# Patient Record
Sex: FEMALE | Race: WHITE | NOT HISPANIC OR LATINO | Employment: PART TIME | URBAN - METROPOLITAN AREA
[De-identification: names, ages, dates, MRNs, and addresses within clinical notes are randomized per-mention and may not be internally consistent; named-entity substitution may affect disease eponyms.]

---

## 2017-04-25 ENCOUNTER — HOSPITAL ENCOUNTER (OUTPATIENT)
Dept: LAB | Facility: HOSPITAL | Age: 62
Discharge: HOME | End: 2017-04-25
Attending: NURSE PRACTITIONER | Admitting: NURSE PRACTITIONER

## 2017-04-25 LAB
ALBUMIN SERPL-MCNC: 4 G/DL (ref 3.5–4.8)
ALBUMIN/GLOB SERPL: 1.3 {RATIO} (ref 1–1.7)
ALP SERPL-CCNC: 82 IU/L (ref 32–91)
ALT SERPL-CCNC: 62 IU/L (ref 14–54)
ANION GAP SERPL CALC-SCNC: 15.7 MMOL/L (ref 10–20)
AST SERPL-CCNC: 56 IU/L (ref 15–41)
BILIRUB SERPL-MCNC: 0.4 MG/DL (ref 0.3–1.2)
BUN SERPL-MCNC: 18 MG/DL (ref 8–20)
BUN/CREAT SERPL: 25.7 (ref 5.4–26.2)
CALCIUM SERPL-MCNC: 9.9 MG/DL (ref 8.9–10.3)
CHLORIDE SERPL-SCNC: 103 MMOL/L (ref 101–111)
CONV CO2: 28 MMOL/L (ref 22–32)
CONV TOTAL PROTEIN: 7 G/DL (ref 6.1–7.9)
CREAT UR-MCNC: 0.7 MG/DL (ref 0.4–1)
GLOBULIN UR ELPH-MCNC: 3 G/DL (ref 2.5–3.8)
GLUCOSE SERPL-MCNC: 106 MG/DL (ref 65–99)
MAGNESIUM SERPL-MCNC: 1.9 MG/DL (ref 1.8–2.5)
POTASSIUM SERPL-SCNC: 3.7 MMOL/L (ref 3.6–5.1)
SODIUM SERPL-SCNC: 143 MMOL/L (ref 136–144)

## 2017-07-31 ENCOUNTER — OFFICE (AMBULATORY)
Dept: URBAN - METROPOLITAN AREA CLINIC 64 | Facility: CLINIC | Age: 62
End: 2017-07-31
Payer: COMMERCIAL

## 2017-07-31 ENCOUNTER — ON CAMPUS - OUTPATIENT (AMBULATORY)
Dept: URBAN - METROPOLITAN AREA HOSPITAL 2 | Facility: HOSPITAL | Age: 62
End: 2017-07-31

## 2017-07-31 VITALS
DIASTOLIC BLOOD PRESSURE: 46 MMHG | DIASTOLIC BLOOD PRESSURE: 73 MMHG | HEART RATE: 77 BPM | DIASTOLIC BLOOD PRESSURE: 43 MMHG | DIASTOLIC BLOOD PRESSURE: 82 MMHG | OXYGEN SATURATION: 98 % | SYSTOLIC BLOOD PRESSURE: 77 MMHG | DIASTOLIC BLOOD PRESSURE: 64 MMHG | DIASTOLIC BLOOD PRESSURE: 67 MMHG | SYSTOLIC BLOOD PRESSURE: 136 MMHG | HEIGHT: 62 IN | HEART RATE: 83 BPM | HEART RATE: 86 BPM | OXYGEN SATURATION: 99 % | HEART RATE: 95 BPM | SYSTOLIC BLOOD PRESSURE: 143 MMHG | HEART RATE: 93 BPM | WEIGHT: 143 LBS | DIASTOLIC BLOOD PRESSURE: 72 MMHG | TEMPERATURE: 97.2 F | HEART RATE: 79 BPM | SYSTOLIC BLOOD PRESSURE: 140 MMHG | RESPIRATION RATE: 12 BRPM | SYSTOLIC BLOOD PRESSURE: 80 MMHG | DIASTOLIC BLOOD PRESSURE: 36 MMHG | OXYGEN SATURATION: 97 % | HEART RATE: 76 BPM | HEART RATE: 80 BPM | RESPIRATION RATE: 17 BRPM | SYSTOLIC BLOOD PRESSURE: 150 MMHG | SYSTOLIC BLOOD PRESSURE: 131 MMHG | SYSTOLIC BLOOD PRESSURE: 109 MMHG | SYSTOLIC BLOOD PRESSURE: 83 MMHG | RESPIRATION RATE: 18 BRPM | RESPIRATION RATE: 16 BRPM | OXYGEN SATURATION: 100 % | OXYGEN SATURATION: 95 % | DIASTOLIC BLOOD PRESSURE: 78 MMHG

## 2017-07-31 DIAGNOSIS — D12.5 BENIGN NEOPLASM OF SIGMOID COLON: ICD-10-CM

## 2017-07-31 DIAGNOSIS — D12.4 BENIGN NEOPLASM OF DESCENDING COLON: ICD-10-CM

## 2017-07-31 DIAGNOSIS — K63.5 POLYP OF COLON: ICD-10-CM

## 2017-07-31 DIAGNOSIS — Z86.010 PERSONAL HISTORY OF COLONIC POLYPS: ICD-10-CM

## 2017-07-31 LAB
GI HISTOLOGY: A. UNSPECIFIED: (no result)
GI HISTOLOGY: B. UNSPECIFIED: (no result)
GI HISTOLOGY: PDF REPORT: (no result)

## 2017-07-31 PROCEDURE — 45385 COLONOSCOPY W/LESION REMOVAL: CPT | Mod: 33 | Performed by: INTERNAL MEDICINE

## 2017-07-31 PROCEDURE — 88305 TISSUE EXAM BY PATHOLOGIST: CPT | Performed by: INTERNAL MEDICINE

## 2017-07-31 RX ADMIN — PROPOFOL: 10 INJECTION, EMULSION INTRAVENOUS at 13:41

## 2017-10-31 ENCOUNTER — HOSPITAL ENCOUNTER (OUTPATIENT)
Dept: MAMMOGRAPHY | Facility: HOSPITAL | Age: 62
Discharge: HOME | End: 2017-10-31
Attending: OBSTETRICS & GYNECOLOGY | Admitting: OBSTETRICS & GYNECOLOGY

## 2020-01-08 PROBLEM — I10 HYPERTENSION: Status: ACTIVE | Noted: 2020-01-08

## 2020-01-08 PROBLEM — J45.909 ASTHMA: Status: ACTIVE | Noted: 2020-01-08

## 2020-01-16 ENCOUNTER — OFFICE VISIT (OUTPATIENT)
Dept: CARDIOLOGY | Facility: CLINIC | Age: 65
End: 2020-01-16

## 2020-01-16 VITALS
OXYGEN SATURATION: 95 % | HEIGHT: 62 IN | BODY MASS INDEX: 30.18 KG/M2 | HEART RATE: 72 BPM | DIASTOLIC BLOOD PRESSURE: 92 MMHG | SYSTOLIC BLOOD PRESSURE: 169 MMHG | WEIGHT: 164 LBS

## 2020-01-16 DIAGNOSIS — R06.02 SOB (SHORTNESS OF BREATH): ICD-10-CM

## 2020-01-16 DIAGNOSIS — R07.89 CHEST PRESSURE: Primary | ICD-10-CM

## 2020-01-16 DIAGNOSIS — I10 ESSENTIAL HYPERTENSION: ICD-10-CM

## 2020-01-16 DIAGNOSIS — I44.7 LBBB (LEFT BUNDLE BRANCH BLOCK): ICD-10-CM

## 2020-01-16 PROCEDURE — 93000 ELECTROCARDIOGRAM COMPLETE: CPT | Performed by: INTERNAL MEDICINE

## 2020-01-16 PROCEDURE — 99204 OFFICE O/P NEW MOD 45 MIN: CPT | Performed by: INTERNAL MEDICINE

## 2020-01-16 RX ORDER — ESCITALOPRAM OXALATE 20 MG/1
1 TABLET ORAL DAILY
COMMUNITY
Start: 2019-10-18

## 2020-01-16 RX ORDER — ALBUTEROL SULFATE 90 UG/1
2 AEROSOL, METERED RESPIRATORY (INHALATION) EVERY 4 HOURS PRN
COMMUNITY

## 2020-01-16 RX ORDER — LOSARTAN POTASSIUM 50 MG/1
1 TABLET ORAL 2 TIMES DAILY
COMMUNITY
Start: 2020-01-13

## 2020-01-16 NOTE — PROGRESS NOTES
Encounter Date:01/16/2020  New patient      Patient ID: Aleah Coleman is a 65 y.o. female.    Chief Complaint:  Chest Pain, Shortness of breath, swelling in feet & hands, elevated blood pressure & cholesterol  Left bundle branch block    History of Present Illness  The patient is a pleasant 65-year-old white female is here for evaluation of exertional chest discomfort and shortness of breath.  For last 2 to 3 months patient has been having fairly typical and predictable exertional chest tightness substernal with radiation of the discomfort into the left arm associated with shortness of breath.  Symptoms occur with activities such as walking in the basement climbing steps and carrying material.  Usually the symptoms are relieved in 5 to 10 minutes after stopping the activity.  Sometimes associated with nausea and lightheadedness but no sweating.  Patient has been having increased shortness of breath with exertion.  No other associated aggravating or elevating factors.  Symptoms are moderate in nature and intermittent and exertional.  Patient has mild lower extremity edema.    Patient is not having any palpitations, dizziness or syncope.  Denies having any headache ,abdominal pain ,nausea, vomiting , diarrhea constipation, loss of weight or loss of appetite.  Denies having any excessive bruising ,hematuria or blood in the stool.    Review of all systems negative except as indicated    Assessment and Plan     ]]]]]]]]]]]]]]]]]]  Impression  ==========  -Exertional chest discomfort and shortness of breath suggestive of angina pectoris.  Cardiac catheterization 11/7/2003 revealed 20% proximal circumflex coronary artery disease and normal left ventricle function.    -Hypertension dyslipidemia.  History of asthma.    -Strong family history of coronary artery disease    -Status post appendectomy back surgery cholecystectomy and hysterectomy.    -Former smoker    -Allergic to penicillin  ==========  Plan  ========  Patient  has symptoms concerning for angina pectoris.  Patient has multiple risk factors.  Ischemic cardiac work-up.  Stress Cardiolite test  Echocardiogram.  Patient may need cardiac catheterization and coronary atrophy.  Medications were reviewed and updated.  Follow-up in the office on the same day.  Further plan will depend on patient's progress.  ]]]]]]]]]]]]]]]           Diagnosis Plan   1. Chest pressure  Adult Transthoracic Echo Complete W/ Cont if Necessary Per Protocol    Stress Test With Myocardial Perfusion One Day   2. SOB (shortness of breath)  Adult Transthoracic Echo Complete W/ Cont if Necessary Per Protocol    Stress Test With Myocardial Perfusion One Day   3. Essential hypertension  Adult Transthoracic Echo Complete W/ Cont if Necessary Per Protocol    Stress Test With Myocardial Perfusion One Day   4. LBBB (left bundle branch block)     LAB RESULTS (LAST 7 DAYS)    CBC        BMP        CMP         BNP        TROPONIN        CoAg        Creatinine Clearance  CrCl cannot be calculated (Patient's most recent lab result is older than the maximum 30 days allowed.).    ABG        Radiology  No radiology results for the last day                The following portions of the patient's history were reviewed and updated as appropriate: allergies, current medications, past family history, past medical history, past social history, past surgical history and problem list.    Review of Systems   Constitution: Negative for fever and malaise/fatigue.   HENT: Negative for congestion and hearing loss.    Eyes: Negative for double vision and visual disturbance.   Cardiovascular: Positive for chest pain and leg swelling. Negative for claudication, dyspnea on exertion and syncope.   Respiratory: Positive for shortness of breath. Negative for cough.    Endocrine: Negative for cold intolerance.   Skin: Negative for color change and rash.   Musculoskeletal: Negative for arthritis and joint pain.   Gastrointestinal: Negative for  abdominal pain and heartburn.   Genitourinary: Negative for hematuria.   Neurological: Negative for excessive daytime sleepiness and dizziness.   Psychiatric/Behavioral: Negative for depression. The patient is not nervous/anxious.    All other systems reviewed and are negative.        Current Outpatient Medications:   •  albuterol sulfate  (90 Base) MCG/ACT inhaler, Inhale 2 puffs Every 4 (Four) Hours As Needed for Wheezing., Disp: , Rfl:   •  escitalopram (LEXAPRO) 20 MG tablet, Take 1 tablet by mouth Daily., Disp: , Rfl:   •  losartan (COZAAR) 50 MG tablet, Take 1 tablet by mouth 2 (Two) Times a Day., Disp: , Rfl:   •  TRELEGY ELLIPTA 100-62.5-25 MCG/INH aerosol powder , Inhale 1 puff Daily., Disp: , Rfl:     Allergies   Allergen Reactions   • Penicillin G Other (See Comments)       Family History   Problem Relation Age of Onset   • Stroke Mother    • No Known Problems Father    • Heart attack Sister    • Heart disease Sister    • Heart disease Brother    • No Known Problems Maternal Aunt    • No Known Problems Maternal Uncle    • No Known Problems Paternal Aunt    • No Known Problems Paternal Uncle    • Heart disease Maternal Grandmother    • Heart disease Maternal Grandfather    • No Known Problems Paternal Grandmother    • No Known Problems Paternal Grandfather    • No Known Problems Other    • Heart disease Brother    • Heart disease Brother    • Anemia Neg Hx    • Arrhythmia Neg Hx    • Asthma Neg Hx    • Clotting disorder Neg Hx    • Fainting Neg Hx    • Heart failure Neg Hx    • Hyperlipidemia Neg Hx    • Hypertension Neg Hx        Past Surgical History:   Procedure Laterality Date   • APPENDECTOMY     • BACK SURGERY      Disc   • CHOLECYSTECTOMY     • HYSTERECTOMY         Past Medical History:   Diagnosis Date   • Asthma    • COPD (chronic obstructive pulmonary disease) (CMS/[facility])    • Hyperlipidemia    • Hypertension        Family History   Problem Relation Age of Onset   • Stroke Mother    • No  "Known Problems Father    • Heart attack Sister    • Heart disease Sister    • Heart disease Brother    • No Known Problems Maternal Aunt    • No Known Problems Maternal Uncle    • No Known Problems Paternal Aunt    • No Known Problems Paternal Uncle    • Heart disease Maternal Grandmother    • Heart disease Maternal Grandfather    • No Known Problems Paternal Grandmother    • No Known Problems Paternal Grandfather    • No Known Problems Other    • Heart disease Brother    • Heart disease Brother    • Anemia Neg Hx    • Arrhythmia Neg Hx    • Asthma Neg Hx    • Clotting disorder Neg Hx    • Fainting Neg Hx    • Heart failure Neg Hx    • Hyperlipidemia Neg Hx    • Hypertension Neg Hx        Social History     Socioeconomic History   • Marital status:      Spouse name: Not on file   • Number of children: Not on file   • Years of education: Not on file   • Highest education level: Not on file   Tobacco Use   • Smoking status: Former Smoker   • Smokeless tobacco: Never Used   Substance and Sexual Activity   • Alcohol use: Never     Frequency: Never   • Drug use: Never   • Sexual activity: Defer           ECG 12 Lead  Date/Time: 1/16/2020 11:34 AM  Performed by: Brett Zhou MD  Authorized by: Brett Zhou MD   Comparison: not compared with previous ECG   Previous ECG: no previous ECG available  Comments: Sinus rhythm left bundle branch block normal axis nonspecific ST-T wave changes no ectopy 72/min.  No previous tracing available for comparison              Objective:       Physical Exam    /92 (BP Location: Left arm, Patient Position: Sitting, Cuff Size: Adult)   Pulse 72   Ht 157.5 cm (62\")   Wt 74.4 kg (164 lb)   SpO2 95%   BMI 30.00 kg/m²   The patient is alert, oriented and in no distress.    Vital signs as noted above.    Head and neck revealed no carotid bruits or jugular venous distension.  No thyromegaly or lymphadenopathy is present.    Lungs clear.  No wheezing.  Breath sounds are " normal bilaterally.    Heart normal first and second heart sounds.  No murmur..  No pericardial rub is present.  No gallop is present.    Abdomen soft and nontender.  No organomegaly is present.    Extremities revealed good peripheral pulses without any pedal edema.    Skin warm and dry.    Musculoskeletal system is grossly normal.    CNS grossly normal.

## 2020-01-23 ENCOUNTER — HOSPITAL ENCOUNTER (OUTPATIENT)
Dept: CARDIOLOGY | Facility: HOSPITAL | Age: 65
Discharge: HOME | End: 2020-01-23

## 2020-01-23 ENCOUNTER — HOSPITAL ENCOUNTER (OUTPATIENT)
Dept: CARDIOLOGY | Facility: HOSPITAL | Age: 65
Discharge: HOME | End: 2020-01-23
Admitting: INTERNAL MEDICINE

## 2020-01-23 ENCOUNTER — OFFICE VISIT (OUTPATIENT)
Dept: CARDIOLOGY | Facility: CLINIC | Age: 65
End: 2020-01-23

## 2020-01-23 VITALS
HEART RATE: 69 BPM | BODY MASS INDEX: 30.36 KG/M2 | SYSTOLIC BLOOD PRESSURE: 152 MMHG | DIASTOLIC BLOOD PRESSURE: 88 MMHG | HEIGHT: 62 IN | WEIGHT: 165 LBS

## 2020-01-23 VITALS
DIASTOLIC BLOOD PRESSURE: 87 MMHG | SYSTOLIC BLOOD PRESSURE: 164 MMHG | HEIGHT: 62 IN | BODY MASS INDEX: 30.36 KG/M2 | WEIGHT: 165 LBS

## 2020-01-23 DIAGNOSIS — R07.89 CHEST PRESSURE: ICD-10-CM

## 2020-01-23 DIAGNOSIS — I10 ESSENTIAL HYPERTENSION: ICD-10-CM

## 2020-01-23 DIAGNOSIS — I20.8 ANGINA AT REST (HCC): ICD-10-CM

## 2020-01-23 DIAGNOSIS — R94.39 ABNORMAL NUCLEAR STRESS TEST: Primary | ICD-10-CM

## 2020-01-23 DIAGNOSIS — R06.02 SOB (SHORTNESS OF BREATH): ICD-10-CM

## 2020-01-23 PROBLEM — I20.89 ANGINA AT REST: Status: ACTIVE | Noted: 2020-01-23

## 2020-01-23 LAB
BH CV ECHO MEAS - ACS: 2 CM
BH CV ECHO MEAS - AO MAX PG (FULL): 5.1 MMHG
BH CV ECHO MEAS - AO MAX PG: 8.2 MMHG
BH CV ECHO MEAS - AO MEAN PG (FULL): 2.7 MMHG
BH CV ECHO MEAS - AO MEAN PG: 4.4 MMHG
BH CV ECHO MEAS - AO ROOT AREA: 6.3 CM^2
BH CV ECHO MEAS - AO ROOT DIAM: 2.8 CM
BH CV ECHO MEAS - AO V2 MAX: 143.5 CM/SEC
BH CV ECHO MEAS - AO V2 MEAN: 100.5 CM/SEC
BH CV ECHO MEAS - AO V2 VTI: 32.2 CM
BH CV ECHO MEAS - ASC AORTA: 2.8 CM
BH CV ECHO MEAS - AVA(I,A): 1.7 CM^2
BH CV ECHO MEAS - AVA(I,D): 1.7 CM^2
BH CV ECHO MEAS - AVA(V,A): 1.6 CM^2
BH CV ECHO MEAS - AVA(V,D): 1.6 CM^2
BH CV ECHO MEAS - EDV(CUBED): 63.7 ML
BH CV ECHO MEAS - EDV(MOD-SP2): 53.9 ML
BH CV ECHO MEAS - EDV(MOD-SP4): 28 ML
BH CV ECHO MEAS - EDV(TEICH): 69.7 ML
BH CV ECHO MEAS - EF(CUBED): 70.3 %
BH CV ECHO MEAS - EF(MOD-SP2): 57.4 %
BH CV ECHO MEAS - EF(MOD-SP4): 49.4 %
BH CV ECHO MEAS - EF(TEICH): 62.5 %
BH CV ECHO MEAS - ESV(CUBED): 18.9 ML
BH CV ECHO MEAS - ESV(MOD-SP2): 22.9 ML
BH CV ECHO MEAS - ESV(MOD-SP4): 14.1 ML
BH CV ECHO MEAS - ESV(TEICH): 26.1 ML
BH CV ECHO MEAS - FS: 33.3 %
BH CV ECHO MEAS - IVS/LVPW: 0.93
BH CV ECHO MEAS - IVSD: 0.99 CM
BH CV ECHO MEAS - LA DIMENSION: 3.6 CM
BH CV ECHO MEAS - LA/AO: 1.3
BH CV ECHO MEAS - LV MASS(C)D: 132.1 GRAMS
BH CV ECHO MEAS - LV MAX PG: 3.1 MMHG
BH CV ECHO MEAS - LV MEAN PG: 1.7 MMHG
BH CV ECHO MEAS - LV V1 MAX: 88.5 CM/SEC
BH CV ECHO MEAS - LV V1 MEAN: 63.1 CM/SEC
BH CV ECHO MEAS - LV V1 VTI: 20.8 CM
BH CV ECHO MEAS - LVIDD: 4 CM
BH CV ECHO MEAS - LVIDS: 2.7 CM
BH CV ECHO MEAS - LVOT AREA: 2.6 CM^2
BH CV ECHO MEAS - LVOT DIAM: 1.8 CM
BH CV ECHO MEAS - LVPWD: 1.1 CM
BH CV ECHO MEAS - MV A MAX VEL: 96.1 CM/SEC
BH CV ECHO MEAS - MV DEC SLOPE: 338.7 CM/SEC^2
BH CV ECHO MEAS - MV DEC TIME: 0.21 SEC
BH CV ECHO MEAS - MV E MAX VEL: 69.6 CM/SEC
BH CV ECHO MEAS - MV E/A: 0.73
BH CV ECHO MEAS - MV MAX PG: 3.5 MMHG
BH CV ECHO MEAS - MV MEAN PG: 1.8 MMHG
BH CV ECHO MEAS - MV V2 MAX: 93.3 CM/SEC
BH CV ECHO MEAS - MV V2 MEAN: 63.7 CM/SEC
BH CV ECHO MEAS - MV V2 VTI: 23.1 CM
BH CV ECHO MEAS - MVA(VTI): 2.4 CM^2
BH CV ECHO MEAS - PA ACC TIME: 0.13 SEC
BH CV ECHO MEAS - PA MAX PG (FULL): 7.2 MMHG
BH CV ECHO MEAS - PA MAX PG: 9.2 MMHG
BH CV ECHO MEAS - PA PR(ACCEL): 20.9 MMHG
BH CV ECHO MEAS - PA V2 MAX: 151.7 CM/SEC
BH CV ECHO MEAS - PULM DIAS VEL: 40.5 CM/SEC
BH CV ECHO MEAS - PULM S/D: 1.9
BH CV ECHO MEAS - PULM SYS VEL: 77.4 CM/SEC
BH CV ECHO MEAS - PVA(V,A): 3.3 CM^2
BH CV ECHO MEAS - PVA(V,D): 3.3 CM^2
BH CV ECHO MEAS - QP/QS: 2
BH CV ECHO MEAS - RAP SYSTOLE: 3 MMHG
BH CV ECHO MEAS - RV MAX PG: 2 MMHG
BH CV ECHO MEAS - RV MEAN PG: 0.92 MMHG
BH CV ECHO MEAS - RV V1 MAX: 71.1 CM/SEC
BH CV ECHO MEAS - RV V1 MEAN: 44.2 CM/SEC
BH CV ECHO MEAS - RV V1 VTI: 15.5 CM
BH CV ECHO MEAS - RVDD: 2.6 CM
BH CV ECHO MEAS - RVOT AREA: 7 CM^2
BH CV ECHO MEAS - RVOT DIAM: 3 CM
BH CV ECHO MEAS - RVSP: 18.8 MMHG
BH CV ECHO MEAS - SV(AO): 204.5 ML
BH CV ECHO MEAS - SV(CUBED): 44.8 ML
BH CV ECHO MEAS - SV(LVOT): 55.1 ML
BH CV ECHO MEAS - SV(MOD-SP2): 31 ML
BH CV ECHO MEAS - SV(MOD-SP4): 13.8 ML
BH CV ECHO MEAS - SV(RVOT): 109 ML
BH CV ECHO MEAS - SV(TEICH): 43.6 ML
BH CV ECHO MEAS - TR MAX VEL: 198.9 CM/SEC
BH CV STRESS BP STAGE 1: NORMAL
BH CV STRESS BP STAGE 2: NORMAL
BH CV STRESS DURATION MIN STAGE 1: 3
BH CV STRESS DURATION MIN STAGE 2: 3
BH CV STRESS DURATION SEC STAGE 1: 0
BH CV STRESS DURATION SEC STAGE 2: 0
BH CV STRESS GRADE STAGE 1: 10
BH CV STRESS GRADE STAGE 2: 12
BH CV STRESS HR STAGE 1: 114
BH CV STRESS HR STAGE 2: 152
BH CV STRESS METS STAGE 1: 5
BH CV STRESS METS STAGE 2: 7.5
BH CV STRESS PROTOCOL 1: NORMAL
BH CV STRESS RECOVERY BP: NORMAL MMHG
BH CV STRESS RECOVERY HR: 111 BPM
BH CV STRESS SPEED STAGE 1: 1.7
BH CV STRESS SPEED STAGE 2: 2.5
BH CV STRESS STAGE 1: 1
BH CV STRESS STAGE 2: 2
LV EF 2D ECHO EST: 55 %
MAXIMAL PREDICTED HEART RATE: 156 BPM
MAXIMAL PREDICTED HEART RATE: 156 BPM
STRESS BASELINE BP: NORMAL MMHG
STRESS BASELINE HR: 71 BPM
STRESS POST EXERCISE DUR MIN: 7 MIN
STRESS TARGET HR: 133 BPM
STRESS TARGET HR: 133 BPM

## 2020-01-23 PROCEDURE — 0 TECHNETIUM SESTAMIBI: Performed by: INTERNAL MEDICINE

## 2020-01-23 PROCEDURE — 99215 OFFICE O/P EST HI 40 MIN: CPT | Performed by: INTERNAL MEDICINE

## 2020-01-23 PROCEDURE — 93306 TTE W/DOPPLER COMPLETE: CPT | Performed by: INTERNAL MEDICINE

## 2020-01-23 PROCEDURE — 93306 TTE W/DOPPLER COMPLETE: CPT

## 2020-01-23 PROCEDURE — 93018 CV STRESS TEST I&R ONLY: CPT | Performed by: INTERNAL MEDICINE

## 2020-01-23 PROCEDURE — 93016 CV STRESS TEST SUPVJ ONLY: CPT | Performed by: INTERNAL MEDICINE

## 2020-01-23 PROCEDURE — 78452 HT MUSCLE IMAGE SPECT MULT: CPT | Performed by: INTERNAL MEDICINE

## 2020-01-23 PROCEDURE — A9500 TC99M SESTAMIBI: HCPCS | Performed by: INTERNAL MEDICINE

## 2020-01-23 PROCEDURE — 93017 CV STRESS TEST TRACING ONLY: CPT

## 2020-01-23 PROCEDURE — 78452 HT MUSCLE IMAGE SPECT MULT: CPT

## 2020-01-23 RX ORDER — ASPIRIN 81 MG/1
81 TABLET ORAL DAILY
COMMUNITY

## 2020-01-23 RX ADMIN — TECHNETIUM TC 99M SESTAMIBI 1 DOSE: 1 INJECTION INTRAVENOUS at 11:45

## 2020-01-23 RX ADMIN — TECHNETIUM TC 99M SESTAMIBI 1 DOSE: 1 INJECTION INTRAVENOUS at 09:45

## 2020-01-23 NOTE — H&P (VIEW-ONLY)
Encounter Date:01/23/2020      Patient ID: Aleah Coleman is a 65 y.o. female.    Chief Complaint:  Chest Pain, Shortness of breath, swelling in feet & hands, elevated blood pressure & cholesterol  Left bundle branch block     History of Present Illness  The patient is a pleasant 65-year-old white female is here for evaluation of exertional chest discomfort and shortness of breath.  For last 2 to 3 months patient has been having fairly typical and predictable exertional chest tightness substernal with radiation of the discomfort into the left arm associated with shortness of breath.  Symptoms occur with activities such as walking in the basement climbing steps and carrying material.  Usually the symptoms are relieved in 5 to 10 minutes after stopping the activity.  Sometimes associated with nausea and lightheadedness but no sweating.  Patient has been having increased shortness of breath with exertion.  No other associated aggravating or elevating factors.  Symptoms are moderate in nature and intermittent and exertional.  Patient has mild lower extremity edema.     Patient is not having any palpitations, dizziness or syncope.  Denies having any headache ,abdominal pain ,nausea, vomiting , diarrhea constipation, loss of weight or loss of appetite.  Denies having any excessive bruising ,hematuria or blood in the stool.     Review of all systems negative except as indicated     Assessment and Plan      ]]]]]]]]]]]]]]]]]]  Impression  ==========  -Exertional chest discomfort and shortness of breath suggestive of angina pectoris.  Cardiac catheterization 11/7/2003 revealed 20% proximal circumflex coronary artery disease and normal left ventricle function.  Echocardiogram showed mild diffuse hypocontractility with ejection fraction of 55%.  Stress Cardiolite test abnormal with anterior and apical ischemia.     -Hypertension dyslipidemia.  History of asthma.     -Strong family history of coronary artery disease     -Status  post appendectomy back surgery cholecystectomy and hysterectomy.     -Former smoker     -Allergic to penicillin  ==========  Plan  ========  Patient has symptoms concerning for angina pectoris.  Patient has multiple risk factors.  Ischemic cardiac work-up.  Stress Cardiolite test-abnormal with anterior and apical ischemia  Echocardiogram.-Mild diffuse hypocontractility  Patient to have cardiac catheterization and coronary arteriography.  Risks and benefits pros and cons of the procedure were discussed with patient.  Medications were reviewed and updated.  Further plan will depend on patient's progress.  ]]]]]]]]]]]]]]]          Diagnosis Plan   1. Abnormal nuclear stress test  Case Request Cath Lab: Left Heart Cath with Coronary Angiography    CBC (No Diff)    Basic Metabolic Panel    Protime-INR    Lipid Panel    ECG 12 Lead    XR Chest 2 View   2. Chest pressure  Case Request Cath Lab: Left Heart Cath with Coronary Angiography    CBC (No Diff)    Basic Metabolic Panel    Protime-INR    Lipid Panel    ECG 12 Lead    XR Chest 2 View   3. Angina at rest (CMS/HCC)  Case Request Cath Lab: Left Heart Cath with Coronary Angiography    CBC (No Diff)    Basic Metabolic Panel    Protime-INR    Lipid Panel    ECG 12 Lead    XR Chest 2 View   4. Essential hypertension  Case Request Cath Lab: Left Heart Cath with Coronary Angiography    CBC (No Diff)    Basic Metabolic Panel    Protime-INR    Lipid Panel    ECG 12 Lead    XR Chest 2 View   LAB RESULTS (LAST 7 DAYS)    CBC        BMP        CMP         BNP        TROPONIN        CoAg        Creatinine Clearance  CrCl cannot be calculated (Patient's most recent lab result is older than the maximum 30 days allowed.).    ABG        Radiology  No radiology results for the last day                The following portions of the patient's history were reviewed and updated as appropriate: allergies, current medications, past family history, past medical history, past social history,  past surgical history and problem list.    Review of Systems   Constitution: Negative for malaise/fatigue.   Cardiovascular: Negative for chest pain, leg swelling, palpitations and syncope.   Respiratory: Negative for shortness of breath.    Skin: Negative for rash.   Gastrointestinal: Negative for nausea and vomiting.   Neurological: Negative for dizziness, light-headedness and numbness.         Current Outpatient Medications:   •  albuterol sulfate  (90 Base) MCG/ACT inhaler, Inhale 2 puffs Every 4 (Four) Hours As Needed for Wheezing., Disp: , Rfl:   •  escitalopram (LEXAPRO) 20 MG tablet, Take 1 tablet by mouth Daily., Disp: , Rfl:   •  losartan (COZAAR) 50 MG tablet, Take 1 tablet by mouth 2 (Two) Times a Day., Disp: , Rfl:   •  TRELEGY ELLIPTA 100-62.5-25 MCG/INH aerosol powder , Inhale 1 puff Daily., Disp: , Rfl:   No current facility-administered medications for this visit.     Allergies   Allergen Reactions   • Penicillin G Other (See Comments)       Family History   Problem Relation Age of Onset   • Stroke Mother    • No Known Problems Father    • Heart attack Sister    • Heart disease Sister    • Heart disease Brother    • No Known Problems Maternal Aunt    • No Known Problems Maternal Uncle    • No Known Problems Paternal Aunt    • No Known Problems Paternal Uncle    • Heart disease Maternal Grandmother    • Heart disease Maternal Grandfather    • No Known Problems Paternal Grandmother    • No Known Problems Paternal Grandfather    • No Known Problems Other    • Heart disease Brother    • Heart disease Brother    • Anemia Neg Hx    • Arrhythmia Neg Hx    • Asthma Neg Hx    • Clotting disorder Neg Hx    • Fainting Neg Hx    • Heart failure Neg Hx    • Hyperlipidemia Neg Hx    • Hypertension Neg Hx        Past Surgical History:   Procedure Laterality Date   • APPENDECTOMY     • BACK SURGERY      Disc   • CHOLECYSTECTOMY     • HYSTERECTOMY         Past Medical History:   Diagnosis Date   • Asthma    •  "COPD (chronic obstructive pulmonary disease) (CMS/[facility])    • Hyperlipidemia    • Hypertension        Family History   Problem Relation Age of Onset   • Stroke Mother    • No Known Problems Father    • Heart attack Sister    • Heart disease Sister    • Heart disease Brother    • No Known Problems Maternal Aunt    • No Known Problems Maternal Uncle    • No Known Problems Paternal Aunt    • No Known Problems Paternal Uncle    • Heart disease Maternal Grandmother    • Heart disease Maternal Grandfather    • No Known Problems Paternal Grandmother    • No Known Problems Paternal Grandfather    • No Known Problems Other    • Heart disease Brother    • Heart disease Brother    • Anemia Neg Hx    • Arrhythmia Neg Hx    • Asthma Neg Hx    • Clotting disorder Neg Hx    • Fainting Neg Hx    • Heart failure Neg Hx    • Hyperlipidemia Neg Hx    • Hypertension Neg Hx        Social History     Socioeconomic History   • Marital status:      Spouse name: Not on file   • Number of children: Not on file   • Years of education: Not on file   • Highest education level: Not on file   Tobacco Use   • Smoking status: Former Smoker   • Smokeless tobacco: Never Used   Substance and Sexual Activity   • Alcohol use: Never     Frequency: Never   • Drug use: Never   • Sexual activity: Defer         Procedures      Objective:       Physical Exam    /88 (BP Location: Left arm, Patient Position: Sitting, Cuff Size: Large Adult)   Pulse 69   Ht 157.5 cm (62\")   Wt 74.8 kg (165 lb)   BMI 30.18 kg/m²   The patient is alert, oriented and in no distress.    Vital signs as noted above.    Head and neck revealed no carotid bruits or jugular venous distension.  No thyromegaly or lymphadenopathy is present.    Lungs clear.  No wheezing.  Breath sounds are normal bilaterally.    Heart normal first and second heart sounds.  No murmur..  No pericardial rub is present.  No gallop is present.    Abdomen soft and nontender.  No organomegaly is " present.    Extremities revealed good peripheral pulses without any pedal edema.    Skin warm and dry.    Musculoskeletal system is grossly normal.    CNS grossly normal.

## 2020-01-23 NOTE — PROGRESS NOTES
Encounter Date:01/23/2020      Patient ID: Aleah Coleman is a 65 y.o. female.    Chief Complaint:  Chest Pain, Shortness of breath, swelling in feet & hands, elevated blood pressure & cholesterol  Left bundle branch block     History of Present Illness  The patient is a pleasant 65-year-old white female is here for evaluation of exertional chest discomfort and shortness of breath.  For last 2 to 3 months patient has been having fairly typical and predictable exertional chest tightness substernal with radiation of the discomfort into the left arm associated with shortness of breath.  Symptoms occur with activities such as walking in the basement climbing steps and carrying material.  Usually the symptoms are relieved in 5 to 10 minutes after stopping the activity.  Sometimes associated with nausea and lightheadedness but no sweating.  Patient has been having increased shortness of breath with exertion.  No other associated aggravating or elevating factors.  Symptoms are moderate in nature and intermittent and exertional.  Patient has mild lower extremity edema.     Patient is not having any palpitations, dizziness or syncope.  Denies having any headache ,abdominal pain ,nausea, vomiting , diarrhea constipation, loss of weight or loss of appetite.  Denies having any excessive bruising ,hematuria or blood in the stool.     Review of all systems negative except as indicated     Assessment and Plan      ]]]]]]]]]]]]]]]]]]  Impression  ==========  -Exertional chest discomfort and shortness of breath suggestive of angina pectoris.  Cardiac catheterization 11/7/2003 revealed 20% proximal circumflex coronary artery disease and normal left ventricle function.  Echocardiogram showed mild diffuse hypocontractility with ejection fraction of 55%.  Stress Cardiolite test abnormal with anterior and apical ischemia.     -Hypertension dyslipidemia.  History of asthma.     -Strong family history of coronary artery disease     -Status  post appendectomy back surgery cholecystectomy and hysterectomy.     -Former smoker     -Allergic to penicillin  ==========  Plan  ========  Patient has symptoms concerning for angina pectoris.  Patient has multiple risk factors.  Ischemic cardiac work-up.  Stress Cardiolite test-abnormal with anterior and apical ischemia  Echocardiogram.-Mild diffuse hypocontractility  Patient to have cardiac catheterization and coronary arteriography.  Risks and benefits pros and cons of the procedure were discussed with patient.  Medications were reviewed and updated.  Further plan will depend on patient's progress.  ]]]]]]]]]]]]]]]          Diagnosis Plan   1. Abnormal nuclear stress test  Case Request Cath Lab: Left Heart Cath with Coronary Angiography    CBC (No Diff)    Basic Metabolic Panel    Protime-INR    Lipid Panel    ECG 12 Lead    XR Chest 2 View   2. Chest pressure  Case Request Cath Lab: Left Heart Cath with Coronary Angiography    CBC (No Diff)    Basic Metabolic Panel    Protime-INR    Lipid Panel    ECG 12 Lead    XR Chest 2 View   3. Angina at rest (CMS/HCC)  Case Request Cath Lab: Left Heart Cath with Coronary Angiography    CBC (No Diff)    Basic Metabolic Panel    Protime-INR    Lipid Panel    ECG 12 Lead    XR Chest 2 View   4. Essential hypertension  Case Request Cath Lab: Left Heart Cath with Coronary Angiography    CBC (No Diff)    Basic Metabolic Panel    Protime-INR    Lipid Panel    ECG 12 Lead    XR Chest 2 View   LAB RESULTS (LAST 7 DAYS)    CBC        BMP        CMP         BNP        TROPONIN        CoAg        Creatinine Clearance  CrCl cannot be calculated (Patient's most recent lab result is older than the maximum 30 days allowed.).    ABG        Radiology  No radiology results for the last day                The following portions of the patient's history were reviewed and updated as appropriate: allergies, current medications, past family history, past medical history, past social history,  past surgical history and problem list.    Review of Systems   Constitution: Negative for malaise/fatigue.   Cardiovascular: Negative for chest pain, leg swelling, palpitations and syncope.   Respiratory: Negative for shortness of breath.    Skin: Negative for rash.   Gastrointestinal: Negative for nausea and vomiting.   Neurological: Negative for dizziness, light-headedness and numbness.         Current Outpatient Medications:   •  albuterol sulfate  (90 Base) MCG/ACT inhaler, Inhale 2 puffs Every 4 (Four) Hours As Needed for Wheezing., Disp: , Rfl:   •  escitalopram (LEXAPRO) 20 MG tablet, Take 1 tablet by mouth Daily., Disp: , Rfl:   •  losartan (COZAAR) 50 MG tablet, Take 1 tablet by mouth 2 (Two) Times a Day., Disp: , Rfl:   •  TRELEGY ELLIPTA 100-62.5-25 MCG/INH aerosol powder , Inhale 1 puff Daily., Disp: , Rfl:   No current facility-administered medications for this visit.     Allergies   Allergen Reactions   • Penicillin G Other (See Comments)       Family History   Problem Relation Age of Onset   • Stroke Mother    • No Known Problems Father    • Heart attack Sister    • Heart disease Sister    • Heart disease Brother    • No Known Problems Maternal Aunt    • No Known Problems Maternal Uncle    • No Known Problems Paternal Aunt    • No Known Problems Paternal Uncle    • Heart disease Maternal Grandmother    • Heart disease Maternal Grandfather    • No Known Problems Paternal Grandmother    • No Known Problems Paternal Grandfather    • No Known Problems Other    • Heart disease Brother    • Heart disease Brother    • Anemia Neg Hx    • Arrhythmia Neg Hx    • Asthma Neg Hx    • Clotting disorder Neg Hx    • Fainting Neg Hx    • Heart failure Neg Hx    • Hyperlipidemia Neg Hx    • Hypertension Neg Hx        Past Surgical History:   Procedure Laterality Date   • APPENDECTOMY     • BACK SURGERY      Disc   • CHOLECYSTECTOMY     • HYSTERECTOMY         Past Medical History:   Diagnosis Date   • Asthma    •  "COPD (chronic obstructive pulmonary disease) (CMS/[facility])    • Hyperlipidemia    • Hypertension        Family History   Problem Relation Age of Onset   • Stroke Mother    • No Known Problems Father    • Heart attack Sister    • Heart disease Sister    • Heart disease Brother    • No Known Problems Maternal Aunt    • No Known Problems Maternal Uncle    • No Known Problems Paternal Aunt    • No Known Problems Paternal Uncle    • Heart disease Maternal Grandmother    • Heart disease Maternal Grandfather    • No Known Problems Paternal Grandmother    • No Known Problems Paternal Grandfather    • No Known Problems Other    • Heart disease Brother    • Heart disease Brother    • Anemia Neg Hx    • Arrhythmia Neg Hx    • Asthma Neg Hx    • Clotting disorder Neg Hx    • Fainting Neg Hx    • Heart failure Neg Hx    • Hyperlipidemia Neg Hx    • Hypertension Neg Hx        Social History     Socioeconomic History   • Marital status:      Spouse name: Not on file   • Number of children: Not on file   • Years of education: Not on file   • Highest education level: Not on file   Tobacco Use   • Smoking status: Former Smoker   • Smokeless tobacco: Never Used   Substance and Sexual Activity   • Alcohol use: Never     Frequency: Never   • Drug use: Never   • Sexual activity: Defer         Procedures      Objective:       Physical Exam    /88 (BP Location: Left arm, Patient Position: Sitting, Cuff Size: Large Adult)   Pulse 69   Ht 157.5 cm (62\")   Wt 74.8 kg (165 lb)   BMI 30.18 kg/m²   The patient is alert, oriented and in no distress.    Vital signs as noted above.    Head and neck revealed no carotid bruits or jugular venous distension.  No thyromegaly or lymphadenopathy is present.    Lungs clear.  No wheezing.  Breath sounds are normal bilaterally.    Heart normal first and second heart sounds.  No murmur..  No pericardial rub is present.  No gallop is present.    Abdomen soft and nontender.  No organomegaly is " present.    Extremities revealed good peripheral pulses without any pedal edema.    Skin warm and dry.    Musculoskeletal system is grossly normal.    CNS grossly normal.

## 2020-01-24 ENCOUNTER — LAB (OUTPATIENT)
Dept: LAB | Facility: HOSPITAL | Age: 65
End: 2020-01-24

## 2020-01-24 ENCOUNTER — HOSPITAL ENCOUNTER (OUTPATIENT)
Facility: HOSPITAL | Age: 65
Setting detail: HOSPITAL OUTPATIENT SURGERY
Discharge: HOME | End: 2020-01-24
Attending: INTERNAL MEDICINE | Admitting: INTERNAL MEDICINE

## 2020-01-24 ENCOUNTER — HOSPITAL ENCOUNTER (OUTPATIENT)
Dept: GENERAL RADIOLOGY | Facility: HOSPITAL | Age: 65
Discharge: HOME | End: 2020-01-24

## 2020-01-24 ENCOUNTER — APPOINTMENT (OUTPATIENT)
Dept: CARDIOLOGY | Facility: HOSPITAL | Age: 65
End: 2020-01-24

## 2020-01-24 VITALS
HEIGHT: 63 IN | OXYGEN SATURATION: 94 % | BODY MASS INDEX: 28.95 KG/M2 | RESPIRATION RATE: 17 BRPM | DIASTOLIC BLOOD PRESSURE: 60 MMHG | SYSTOLIC BLOOD PRESSURE: 139 MMHG | HEART RATE: 89 BPM | WEIGHT: 163.36 LBS | TEMPERATURE: 97.8 F

## 2020-01-24 DIAGNOSIS — R94.39 ABNORMAL NUCLEAR STRESS TEST: ICD-10-CM

## 2020-01-24 DIAGNOSIS — I20.8 ANGINA AT REST (HCC): ICD-10-CM

## 2020-01-24 DIAGNOSIS — R07.89 CHEST PRESSURE: ICD-10-CM

## 2020-01-24 DIAGNOSIS — I10 ESSENTIAL HYPERTENSION: ICD-10-CM

## 2020-01-24 LAB
ANION GAP SERPL CALCULATED.3IONS-SCNC: 13 MMOL/L (ref 5–15)
BUN BLD-MCNC: 15 MG/DL (ref 8–23)
BUN/CREAT SERPL: 23.1 (ref 7–25)
CALCIUM SPEC-SCNC: 9.6 MG/DL (ref 8.6–10.5)
CHLORIDE SERPL-SCNC: 103 MMOL/L (ref 98–107)
CHOLEST SERPL-MCNC: 227 MG/DL (ref 0–200)
CO2 SERPL-SCNC: 27 MMOL/L (ref 22–29)
CREAT BLD-MCNC: 0.65 MG/DL (ref 0.57–1)
DEPRECATED RDW RBC AUTO: 43.3 FL (ref 37–54)
ERYTHROCYTE [DISTWIDTH] IN BLOOD BY AUTOMATED COUNT: 13.8 % (ref 12.3–15.4)
GFR SERPL CREATININE-BSD FRML MDRD: 92 ML/MIN/1.73
GLUCOSE BLD-MCNC: 95 MG/DL (ref 65–99)
HCT VFR BLD AUTO: 38.9 % (ref 34–46.6)
HDLC SERPL-MCNC: 71 MG/DL (ref 40–60)
HGB BLD-MCNC: 13.5 G/DL (ref 12–15.9)
INR PPP: 1.08 (ref 0.9–1.1)
LDLC SERPL CALC-MCNC: 135 MG/DL (ref 0–100)
LDLC/HDLC SERPL: 1.9 {RATIO}
MCH RBC QN AUTO: 30.7 PG (ref 26.6–33)
MCHC RBC AUTO-ENTMCNC: 34.6 G/DL (ref 31.5–35.7)
MCV RBC AUTO: 88.5 FL (ref 79–97)
PLATELET # BLD AUTO: 253 10*3/MM3 (ref 140–450)
PMV BLD AUTO: 8.6 FL (ref 6–12)
POTASSIUM BLD-SCNC: 3.9 MMOL/L (ref 3.5–5.2)
PROTHROMBIN TIME: 11.2 SECONDS (ref 9.6–11.7)
RBC # BLD AUTO: 4.39 10*6/MM3 (ref 3.77–5.28)
SODIUM BLD-SCNC: 143 MMOL/L (ref 136–145)
TRIGL SERPL-MCNC: 104 MG/DL (ref 0–150)
VLDLC SERPL-MCNC: 20.8 MG/DL
WBC NRBC COR # BLD: 5.4 10*3/MM3 (ref 3.4–10.8)

## 2020-01-24 PROCEDURE — 93458 L HRT ARTERY/VENTRICLE ANGIO: CPT | Performed by: INTERNAL MEDICINE

## 2020-01-24 PROCEDURE — 25010000002 MIDAZOLAM PER 1 MG: Performed by: INTERNAL MEDICINE

## 2020-01-24 PROCEDURE — 80061 LIPID PANEL: CPT

## 2020-01-24 PROCEDURE — 71046 X-RAY EXAM CHEST 2 VIEWS: CPT

## 2020-01-24 PROCEDURE — 85610 PROTHROMBIN TIME: CPT

## 2020-01-24 PROCEDURE — 80048 BASIC METABOLIC PNL TOTAL CA: CPT

## 2020-01-24 PROCEDURE — C1894 INTRO/SHEATH, NON-LASER: HCPCS | Performed by: INTERNAL MEDICINE

## 2020-01-24 PROCEDURE — 0 IOPAMIDOL PER 1 ML: Performed by: INTERNAL MEDICINE

## 2020-01-24 PROCEDURE — 99152 MOD SED SAME PHYS/QHP 5/>YRS: CPT | Performed by: INTERNAL MEDICINE

## 2020-01-24 PROCEDURE — C1769 GUIDE WIRE: HCPCS | Performed by: INTERNAL MEDICINE

## 2020-01-24 PROCEDURE — 36415 COLL VENOUS BLD VENIPUNCTURE: CPT

## 2020-01-24 PROCEDURE — 25010000002 FENTANYL CITRATE (PF) 100 MCG/2ML SOLUTION: Performed by: INTERNAL MEDICINE

## 2020-01-24 PROCEDURE — 85027 COMPLETE CBC AUTOMATED: CPT

## 2020-01-24 RX ORDER — FENTANYL CITRATE 50 UG/ML
INJECTION, SOLUTION INTRAMUSCULAR; INTRAVENOUS AS NEEDED
Status: DISCONTINUED | OUTPATIENT
Start: 2020-01-24 | End: 2020-01-24 | Stop reason: HOSPADM

## 2020-01-24 RX ORDER — MIDAZOLAM HYDROCHLORIDE 1 MG/ML
INJECTION INTRAMUSCULAR; INTRAVENOUS AS NEEDED
Status: DISCONTINUED | OUTPATIENT
Start: 2020-01-24 | End: 2020-01-24 | Stop reason: HOSPADM

## 2020-01-24 RX ORDER — DIPHENHYDRAMINE HCL 25 MG
25 TABLET ORAL EVERY 6 HOURS PRN
Status: DISCONTINUED | OUTPATIENT
Start: 2020-01-24 | End: 2020-01-25 | Stop reason: HOSPADM

## 2020-01-24 RX ORDER — ONDANSETRON 2 MG/ML
4 INJECTION INTRAMUSCULAR; INTRAVENOUS EVERY 6 HOURS PRN
Status: DISCONTINUED | OUTPATIENT
Start: 2020-01-24 | End: 2020-01-25 | Stop reason: HOSPADM

## 2020-01-24 RX ORDER — ATROPINE SULFATE 1 MG/ML
.5-1 INJECTION, SOLUTION INTRAMUSCULAR; INTRAVENOUS; SUBCUTANEOUS
Status: DISCONTINUED | OUTPATIENT
Start: 2020-01-24 | End: 2020-01-25 | Stop reason: HOSPADM

## 2020-01-24 RX ORDER — METOPROLOL SUCCINATE 25 MG/1
25 TABLET, EXTENDED RELEASE ORAL
Status: DISCONTINUED | OUTPATIENT
Start: 2020-01-24 | End: 2020-01-25 | Stop reason: HOSPADM

## 2020-01-24 RX ORDER — ONDANSETRON 4 MG/1
4 TABLET, FILM COATED ORAL EVERY 6 HOURS PRN
Status: DISCONTINUED | OUTPATIENT
Start: 2020-01-24 | End: 2020-01-25 | Stop reason: HOSPADM

## 2020-01-24 RX ORDER — SODIUM CHLORIDE 9 MG/ML
250 INJECTION, SOLUTION INTRAVENOUS ONCE AS NEEDED
Status: DISCONTINUED | OUTPATIENT
Start: 2020-01-24 | End: 2020-01-25 | Stop reason: HOSPADM

## 2020-01-24 RX ORDER — LIDOCAINE HYDROCHLORIDE 20 MG/ML
INJECTION, SOLUTION INFILTRATION; PERINEURAL AS NEEDED
Status: DISCONTINUED | OUTPATIENT
Start: 2020-01-24 | End: 2020-01-24 | Stop reason: HOSPADM

## 2020-01-24 RX ORDER — ACETAMINOPHEN 325 MG/1
650 TABLET ORAL EVERY 4 HOURS PRN
Status: DISCONTINUED | OUTPATIENT
Start: 2020-01-24 | End: 2020-01-25 | Stop reason: HOSPADM

## 2020-01-24 RX ADMIN — METOPROLOL SUCCINATE 25 MG: 25 TABLET, EXTENDED RELEASE ORAL at 18:07

## 2020-01-25 NOTE — DISCHARGE INSTR - ACTIVITY
Post Cath Instructions    Prescriptions given?  Yes, Metoprolol     Call Dr. Zhou’s office to schedule a follow up appointment in 2-4 weeks at [phone].    1) Drink plenty of fluids for the next 24 hours.  This helps to eliminate the dye used in your procedure through urination.  You may resume a normal diet; however, try to avoid foods that would cause gas or constipation.    2) Sedative medication given to you during your catheterization may decrease your judgement and reaction time for up to 24-48 hours.  Therefore:  a. DO NOT drive or operate hazardous machinery (48 hours)  b. DO NOT consume alcoholic beverages  c. DO NOT make any important/legal decisions  d. Have someone stay with you for at least 24 hours    3) To allow proper healing and prevent bleeding, the following activities are to be strictly avoided for the next 24-48 hours:  a. Excessive bending at wound site  b. Straining (anything that would tense up muscles around the affected puncture site)  c. Lifting objects greater than 5 pounds, pushing, or pulling for 5 days  i. For Groin Cases:  1. Refrain from sexual activity  2. Refrain from running or vigorous walking  3. No prolonged sitting or standing  4. Limit stair climbing as much as possible    4) Keep the puncture site clean and dry.  You may remove the dressing tomorrow and replace it with a band-aid for at least one additional day.  Gently clean the site with mild soap and water.  No scrubbing/rubbing and lightly pat the area dry.  Showers are acceptable; however, avoid submerging in water (tub baths, hot tubs, swimming pools, dishwater, etc…) for at least one week.  The site should be completely healed before resuming these activities to reduce the risk of infection.  Check the site often.  Watch for signs and symptoms of infection and notify your physician if any of the following occur:  a. Bleeding or an increase in swelling at the puncture site  b. Fever  c. Increased soreness around  puncture site  d. Foul odor or significant drainage from the puncture site  e. Swelling, redness, or warmth at the puncture site    **A bruise or small “pea sized” lump under the skin at the puncture site is not unusual.  This should disappear within 3-4 weeks.**  5) CONTACT YOUR PHYSICIAN OR CALL 911 IF YOU EXPERIENCE ANY OF THE FOLLOWING:  a. Increased angina (chest pain) or frequent sensations of pressure, burning, pain, or other discomfort in the chest, arm, jaws, or stomach  b. Lightheadedness, dizziness, faint feeling, sweating, or difficulty breathing  c. Odd sensation changes like numbness, tingling, coldness, or pain in the arm or leg in which the catheter was inserted  d. Limb in which the catheter was inserted becomes pale/bluish in color    IMPORTANT:  Although this occurs very rarely, if you should develop bright red or excessive bleeding, feel a “pop” inside at the insertion site, or notice a sudden increase in swelling larger than a walnut, you should call 911.  Hold continuous firm pressure to the access site until emergency personnel arrive.  It is best if someone else can do this for you.

## 2020-02-10 ENCOUNTER — OFFICE VISIT (OUTPATIENT)
Dept: CARDIOLOGY | Facility: CLINIC | Age: 65
End: 2020-02-10

## 2020-02-10 VITALS
BODY MASS INDEX: 29.32 KG/M2 | OXYGEN SATURATION: 95 % | DIASTOLIC BLOOD PRESSURE: 99 MMHG | HEIGHT: 63 IN | WEIGHT: 165.5 LBS | HEART RATE: 71 BPM | SYSTOLIC BLOOD PRESSURE: 175 MMHG

## 2020-02-10 DIAGNOSIS — R06.02 SOB (SHORTNESS OF BREATH): ICD-10-CM

## 2020-02-10 DIAGNOSIS — I44.7 LBBB (LEFT BUNDLE BRANCH BLOCK): ICD-10-CM

## 2020-02-10 DIAGNOSIS — R07.89 CHEST PRESSURE: Primary | ICD-10-CM

## 2020-02-10 DIAGNOSIS — I10 ESSENTIAL HYPERTENSION: ICD-10-CM

## 2020-02-10 PROCEDURE — 99214 OFFICE O/P EST MOD 30 MIN: CPT | Performed by: INTERNAL MEDICINE

## 2020-02-10 RX ORDER — METOPROLOL SUCCINATE 25 MG/1
25 TABLET, EXTENDED RELEASE ORAL 3 TIMES DAILY
COMMUNITY
Start: 2020-04-30 | End: 2020-05-18 | Stop reason: SDUPTHER

## 2020-02-10 NOTE — PROGRESS NOTES
Encounter Date:02/10/2020  Last seen 1/23/2020      Patient ID: Aleah Coleman is a 65 y.o. female.    Chief Complaint:  Hypertension  Chest discomfort  Coronary artery disease  Dyslipidemia      History of Present Illness  Patient had cardiac catheterization on 1/24/2020.  Patient was discharged home on metoprolol XL 25 mg a day.    Since I have last seen, the patient has been without any chest discomfort ,shortness of breath, palpitations, dizziness or syncope.  Denies having any headache ,abdominal pain ,nausea, vomiting , diarrhea constipation, loss of weight or loss of appetite.  Denies having any excessive bruising ,hematuria or blood in the stool.    Review of all systems negative except as indicated      Assessment and Plan       ]]]]]]]]]]]]]]]]]]  Impression  ==========  -Exertional chest discomfort and shortness of breath suggestive of angina pectoris.    Cardiac catheterization 1/24/2020  Left ventricle size and contractility is normal with ejection fraction of 60%.  Left main coronary artery normal.  Left anterior descending artery has diffuse calcification with mid segment 50% disease.  Circumflex coronary artery has ostial 50% disease.  Codominant vessel.  Right coronary artery is a codominant vessel that has 30 to 40% disease in the proximal segment.    Echocardiogram showed mild diffuse hypocontractility with ejection fraction of 55%.  Stress Cardiolite test abnormal with anterior and apical ischemia.     -Hypertension dyslipidemia.  History of asthma.     -Strong family history of coronary artery disease     -Status post appendectomy back surgery cholecystectomy and hysterectomy.     -Former smoker     -Allergic to penicillin  ==========  Plan  ========  Recent cardiac catheterization results were discussed and educated patient.  Hypertension-not well controlled.  Blood pressure 175/79.  Increase metoprolol XL to 25 mg p.o. twice daily  Medications were reviewed and updated.  Follow-up in the  office in 6 weeks.  Further plan will depend on patient's progress.  ]]]]]]]]]]]]]]]           Diagnosis Plan   1. Chest pressure     2. SOB (shortness of breath)     3. Essential hypertension     4. LBBB (left bundle branch block)     LAB RESULTS (LAST 7 DAYS)    CBC        BMP        CMP         BNP        TROPONIN        CoAg        Creatinine Clearance  Estimated Creatinine Clearance: 84.9 mL/min (by C-G formula based on SCr of 0.65 mg/dL).    ABG        Radiology  No radiology results for the last day                The following portions of the patient's history were reviewed and updated as appropriate: allergies, current medications, past family history, past social history, past surgical history and problem list.    Review of Systems   Constitution: Negative for malaise/fatigue.   Cardiovascular: Negative for leg swelling, palpitations and syncope. Chest pain: pressure.   Respiratory: Shortness of breath: at times.    Skin: Negative for rash.   Musculoskeletal: Joint swelling: hands/face.   Gastrointestinal: Negative for nausea and vomiting.   Neurological: Negative for dizziness and light-headedness. Numbness: tingling in hands.         Current Outpatient Medications:   •  albuterol sulfate  (90 Base) MCG/ACT inhaler, Inhale 2 puffs Every 4 (Four) Hours As Needed for Wheezing., Disp: , Rfl:   •  aspirin 81 MG EC tablet, Take 81 mg by mouth Daily., Disp: , Rfl:   •  escitalopram (LEXAPRO) 20 MG tablet, Take 1 tablet by mouth Daily., Disp: , Rfl:   •  losartan (COZAAR) 50 MG tablet, Take 1 tablet by mouth 2 (Two) Times a Day., Disp: , Rfl:   •  metoprolol succinate XL (TOPROL-XL) 25 MG 24 hr tablet, TK 1 T PO QAM, Disp: , Rfl:   •  Multiple Vitamins-Minerals (CENTRUM SILVER 50+WOMEN PO), Take 1 tablet by mouth Daily., Disp: , Rfl:   •  TRELEGY ELLIPTA 100-62.5-25 MCG/INH aerosol powder , Inhale 1 puff Daily., Disp: , Rfl:     Allergies   Allergen Reactions   • Penicillin G Other (See Comments)   •  Vancomycin Swelling       Family History   Problem Relation Age of Onset   • Stroke Mother    • No Known Problems Father    • Heart attack Sister    • Heart disease Sister    • Heart disease Brother    • No Known Problems Maternal Aunt    • No Known Problems Maternal Uncle    • No Known Problems Paternal Aunt    • No Known Problems Paternal Uncle    • Heart disease Maternal Grandmother    • Heart disease Maternal Grandfather    • No Known Problems Paternal Grandmother    • No Known Problems Paternal Grandfather    • No Known Problems Other    • Heart disease Brother    • Heart disease Brother    • Anemia Neg Hx    • Arrhythmia Neg Hx    • Asthma Neg Hx    • Clotting disorder Neg Hx    • Fainting Neg Hx    • Heart failure Neg Hx    • Hyperlipidemia Neg Hx    • Hypertension Neg Hx        Past Surgical History:   Procedure Laterality Date   • APPENDECTOMY     • BACK SURGERY      Disc   • CARDIAC CATHETERIZATION N/A 1/24/2020    Procedure: Left Heart Cath with Coronary Angiography;  Surgeon: Brett Zhou MD;  [facility];  Service: Cardiovascular   • CARDIAC CATHETERIZATION N/A 1/24/2020    Procedure: Left ventriculography;  Surgeon: Brett Zhou MD;  [facility];  Service: Cardiovascular   • CHOLECYSTECTOMY     • HYSTERECTOMY         Past Medical History:   Diagnosis Date   • Asthma    • COPD (chronic obstructive pulmonary disease) (CMS/[facility])    • Hyperlipidemia    • Hypertension        Family History   Problem Relation Age of Onset   • Stroke Mother    • No Known Problems Father    • Heart attack Sister    • Heart disease Sister    • Heart disease Brother    • No Known Problems Maternal Aunt    • No Known Problems Maternal Uncle    • No Known Problems Paternal Aunt    • No Known Problems Paternal Uncle    • Heart disease Maternal Grandmother    • Heart disease Maternal Grandfather    • No Known Problems Paternal Grandmother    • No Known Problems  "Paternal Grandfather    • No Known Problems Other    • Heart disease Brother    • Heart disease Brother    • Anemia Neg Hx    • Arrhythmia Neg Hx    • Asthma Neg Hx    • Clotting disorder Neg Hx    • Fainting Neg Hx    • Heart failure Neg Hx    • Hyperlipidemia Neg Hx    • Hypertension Neg Hx        Social History     Socioeconomic History   • Marital status:      Spouse name: Not on file   • Number of children: Not on file   • Years of education: Not on file   • Highest education level: Not on file   Tobacco Use   • Smoking status: Former Smoker     Last attempt to quit:      Years since quittin.1   • Smokeless tobacco: Never Used   Substance and Sexual Activity   • Alcohol use: Never     Frequency: Never   • Drug use: Never   • Sexual activity: Defer         Procedures      Objective:       Physical Exam    /99 (BP Location: Left arm, Patient Position: Sitting, Cuff Size: Adult)   Pulse 71   Ht 160 cm (63\")   Wt 75.1 kg (165 lb 8 oz)   SpO2 95%   BMI 29.32 kg/m²   The patient is alert, oriented and in no distress.    Vital signs as noted above.    Head and neck revealed no carotid bruits or jugular venous distension.  No thyromegaly or lymphadenopathy is present.    Lungs clear.  No wheezing.  Breath sounds are normal bilaterally.    Heart normal first and second heart sounds.  No murmur..  No pericardial rub is present.  No gallop is present.    Abdomen soft and nontender.  No organomegaly is present.    Extremities revealed good peripheral pulses without any pedal edema.    Skin warm and dry.    Musculoskeletal system is grossly normal.    CNS grossly normal.        "

## 2020-03-26 ENCOUNTER — TELEPHONE (OUTPATIENT)
Dept: CARDIOLOGY | Facility: CLINIC | Age: 65
End: 2020-03-26

## 2020-04-28 ENCOUNTER — TELEPHONE (OUTPATIENT)
Dept: CARDIOLOGY | Facility: CLINIC | Age: 65
End: 2020-04-28

## 2020-04-28 NOTE — TELEPHONE ENCOUNTER
Increase metoprolol to 3 tablets a day if she is taking twice a day at this time.  Follow-up appointment in the office

## 2020-05-07 ENCOUNTER — TELEMEDICINE (OUTPATIENT)
Dept: CARDIOLOGY | Facility: CLINIC | Age: 65
End: 2020-05-07

## 2020-05-07 VITALS
WEIGHT: 160 LBS | HEART RATE: 74 BPM | DIASTOLIC BLOOD PRESSURE: 93 MMHG | SYSTOLIC BLOOD PRESSURE: 162 MMHG | HEIGHT: 64 IN | BODY MASS INDEX: 27.31 KG/M2

## 2020-05-07 DIAGNOSIS — I10 ESSENTIAL HYPERTENSION: ICD-10-CM

## 2020-05-07 DIAGNOSIS — R06.02 SOB (SHORTNESS OF BREATH): ICD-10-CM

## 2020-05-07 DIAGNOSIS — I20.8 ANGINA AT REST (HCC): ICD-10-CM

## 2020-05-07 DIAGNOSIS — I44.7 LBBB (LEFT BUNDLE BRANCH BLOCK): ICD-10-CM

## 2020-05-07 DIAGNOSIS — R07.89 CHEST PRESSURE: Primary | ICD-10-CM

## 2020-05-07 PROCEDURE — 99214 OFFICE O/P EST MOD 30 MIN: CPT | Performed by: INTERNAL MEDICINE

## 2020-05-07 NOTE — PROGRESS NOTES
You have chosen to receive care through a telehealth visit.  Do you consent to use a video/audio connection for your medical care today? Yes    Encounter Date:05/07/2020  Last seen 2/10/2020      Patient ID: Aleah Coleman is a 65 y.o. female.    Chief Complaint:  Hypertension-not well controlled- new problem  Chest discomfort  Dyslipidemia  Coronary artery disease    History of Present Illness  Occasional chest pressure.  Blood pressures running on the higher side 160/90    Since I have last seen, the patient has been without any ,shortness of breath, palpitations, dizziness or syncope.  Denies having any headache ,abdominal pain ,nausea, vomiting , diarrhea constipation, loss of weight or loss of appetite.  Denies having any excessive bruising ,hematuria or blood in the stool.    Review of all systems negative except as indicated a  Assessment and Plan       ]]]]]]]]]]]]]]]]]]  Impression  ==========  -Exertional chest discomfort and shortness of breath suggestive of angina pectoris.-Better     Cardiac catheterization 1/24/2020  Left ventricle size and contractility is normal with ejection fraction of 60%.  Left main coronary artery normal.  Left anterior descending artery has diffuse calcification with mid segment 50% disease.  Circumflex coronary artery has ostial 50% disease.  Codominant vessel.  Right coronary artery is a codominant vessel that has 30 to 40% disease in the proximal segment.     Echocardiogram showed mild diffuse hypocontractility with ejection fraction of 55%.  Stress Cardiolite test abnormal with anterior and apical ischemia.     -Hypertension dyslipidemia.  History of asthma.     -Strong family history of coronary artery disease     -Status post appendectomy back surgery cholecystectomy and hysterectomy.     -Former smoker     -Allergic to penicillin  ==========  Plan  ========  Video visit  Occasional chest discomfort  Recent cardiac catheterization results were discussed and educated  patient.  Hypertension-not well controlled.  Blood pressure 160/90  Increase metoprolol XL to 25 mg p.o. 2 tablets twice daily  Medications were reviewed and updated.  Follow-up in the office in 3 months  Further plan will depend on patient's progress.  ]]]]]]]]]]]]]]]               Diagnosis Plan   1. Chest pressure     2. SOB (shortness of breath)     3. LBBB (left bundle branch block)     4. Angina at rest (CMS/HCC)     5. Essential hypertension     LAB RESULTS (LAST 7 DAYS)    CBC        BMP        CMP         BNP        TROPONIN        CoAg        Creatinine Clearance  CrCl cannot be calculated (Patient's most recent lab result is older than the maximum 30 days allowed.).    ABG        Radiology  No radiology results for the last day                The following portions of the patient's history were reviewed and updated as appropriate: allergies, current medications, past family history, past medical history, past social history, past surgical history and problem list.    Review of Systems   Constitution: Negative for fever and malaise/fatigue.   HENT: Negative for congestion and hearing loss.    Eyes: Negative for double vision and visual disturbance.   Cardiovascular: Positive for chest pain (pressure), leg swelling (ankles) and palpitations. Negative for claudication, dyspnea on exertion and syncope.   Respiratory: Negative for cough and shortness of breath.    Endocrine: Negative for cold intolerance.   Skin: Negative for color change and rash.   Musculoskeletal: Negative for arthritis and joint pain.   Gastrointestinal: Negative for abdominal pain and heartburn.   Genitourinary: Negative for hematuria.   Neurological: Positive for dizziness (position changes). Negative for excessive daytime sleepiness.   Psychiatric/Behavioral: Negative for depression. The patient is not nervous/anxious.    All other systems reviewed and are negative.        Current Outpatient Medications:   •  albuterol sulfate   (90 Base) MCG/ACT inhaler, Inhale 2 puffs Every 4 (Four) Hours As Needed for Wheezing., Disp: , Rfl:   •  aspirin 81 MG EC tablet, Take 81 mg by mouth Daily., Disp: , Rfl:   •  escitalopram (LEXAPRO) 20 MG tablet, Take 1 tablet by mouth Daily., Disp: , Rfl:   •  losartan (COZAAR) 50 MG tablet, Take 1 tablet by mouth 2 (Two) Times a Day., Disp: , Rfl:   •  metoprolol succinate XL (TOPROL-XL) 25 MG 24 hr tablet, Take 25 mg by mouth 3 (Three) Times a Day., Disp: , Rfl:   •  Multiple Vitamins-Minerals (CENTRUM SILVER 50+WOMEN PO), Take 1 tablet by mouth Daily., Disp: , Rfl:   •  TRELEGY ELLIPTA 100-62.5-25 MCG/INH aerosol powder , Inhale 1 puff Daily., Disp: , Rfl:     Allergies   Allergen Reactions   • Penicillin G Other (See Comments)   • Vancomycin Swelling       Family History   Problem Relation Age of Onset   • Stroke Mother    • No Known Problems Father    • Heart attack Sister    • Heart disease Sister    • Heart disease Brother    • No Known Problems Maternal Aunt    • No Known Problems Maternal Uncle    • No Known Problems Paternal Aunt    • No Known Problems Paternal Uncle    • Heart disease Maternal Grandmother    • Heart disease Maternal Grandfather    • No Known Problems Paternal Grandmother    • No Known Problems Paternal Grandfather    • No Known Problems Other    • Heart disease Brother    • Heart disease Brother    • Anemia Neg Hx    • Arrhythmia Neg Hx    • Asthma Neg Hx    • Clotting disorder Neg Hx    • Fainting Neg Hx    • Heart failure Neg Hx    • Hyperlipidemia Neg Hx    • Hypertension Neg Hx        Past Surgical History:   Procedure Laterality Date   • APPENDECTOMY     • BACK SURGERY      Disc   • CARDIAC CATHETERIZATION N/A 1/24/2020    Procedure: Left Heart Cath with Coronary Angiography;  Surgeon: Brett Zhou MD;  Location: [facility] INVASIVE LOCATION;  Service: Cardiovascular   • CARDIAC CATHETERIZATION N/A 1/24/2020    Procedure: Left ventriculography;  Surgeon: Brett Zhou MD;  " Location: [facility] INVASIVE LOCATION;  Service: Cardiovascular   • CHOLECYSTECTOMY     • HYSTERECTOMY         Past Medical History:   Diagnosis Date   • Asthma    • COPD (chronic obstructive pulmonary disease) (CMS/[facility])    • Hyperlipidemia    • Hypertension    • Sleep apnea     on a cpap since 2019       Family History   Problem Relation Age of Onset   • Stroke Mother    • No Known Problems Father    • Heart attack Sister    • Heart disease Sister    • Heart disease Brother    • No Known Problems Maternal Aunt    • No Known Problems Maternal Uncle    • No Known Problems Paternal Aunt    • No Known Problems Paternal Uncle    • Heart disease Maternal Grandmother    • Heart disease Maternal Grandfather    • No Known Problems Paternal Grandmother    • No Known Problems Paternal Grandfather    • No Known Problems Other    • Heart disease Brother    • Heart disease Brother    • Anemia Neg Hx    • Arrhythmia Neg Hx    • Asthma Neg Hx    • Clotting disorder Neg Hx    • Fainting Neg Hx    • Heart failure Neg Hx    • Hyperlipidemia Neg Hx    • Hypertension Neg Hx        Social History     Socioeconomic History   • Marital status:      Spouse name: Not on file   • Number of children: Not on file   • Years of education: Not on file   • Highest education level: Not on file   Tobacco Use   • Smoking status: Former Smoker     Last attempt to quit:      Years since quittin.3   • Smokeless tobacco: Never Used   Substance and Sexual Activity   • Alcohol use: Yes     Alcohol/week: 1.0 standard drinks     Types: 1 Glasses of wine per week     Frequency: Never   • Drug use: Never     Comment: CBD oils   • Sexual activity: Defer         Procedures      Objective:       Physical Exam    /93   Pulse 74   Ht 162.6 cm (64\")   Wt 72.6 kg (160 lb)   BMI 27.46 kg/m²   The patient is alert, oriented and in no distress.    Vital signs as noted above.    Speech is normal.    No visible or audible shortness of " breath.    No visible thyromegaly or jugular venous distention.    Skin-dry-no sweating    CNS grossly normal.

## 2020-05-18 RX ORDER — METOPROLOL SUCCINATE 25 MG/1
50 TABLET, EXTENDED RELEASE ORAL 2 TIMES DAILY
Qty: 120 TABLET | Refills: 5 | Status: SHIPPED | OUTPATIENT
Start: 2020-05-18 | End: 2020-09-08

## 2020-05-19 RX ORDER — METOPROLOL SUCCINATE 50 MG/1
TABLET, EXTENDED RELEASE ORAL
Qty: 180 TABLET | OUTPATIENT
Start: 2020-05-19

## 2020-09-08 RX ORDER — METOPROLOL SUCCINATE 50 MG/1
50 TABLET, EXTENDED RELEASE ORAL 2 TIMES DAILY
Qty: 180 TABLET | Refills: 0 | Status: SHIPPED | OUTPATIENT
Start: 2020-09-08 | End: 2020-12-01 | Stop reason: SDUPTHER

## 2020-12-01 RX ORDER — METOPROLOL SUCCINATE 50 MG/1
50 TABLET, EXTENDED RELEASE ORAL 2 TIMES DAILY
Qty: 180 TABLET | Refills: 1 | Status: SHIPPED | OUTPATIENT
Start: 2020-12-01 | End: 2021-06-01

## 2021-06-01 RX ORDER — METOPROLOL SUCCINATE 50 MG/1
TABLET, EXTENDED RELEASE ORAL
Qty: 180 TABLET | Refills: 1 | Status: SHIPPED | OUTPATIENT
Start: 2021-06-01 | End: 2021-12-06 | Stop reason: SDUPTHER

## 2021-11-29 RX ORDER — METOPROLOL SUCCINATE 50 MG/1
TABLET, EXTENDED RELEASE ORAL
Qty: 180 TABLET | Refills: 1 | OUTPATIENT
Start: 2021-11-29

## 2021-12-06 ENCOUNTER — TELEPHONE (OUTPATIENT)
Dept: CARDIOLOGY | Facility: CLINIC | Age: 66
End: 2021-12-06

## 2021-12-06 RX ORDER — METOPROLOL SUCCINATE 50 MG/1
50 TABLET, EXTENDED RELEASE ORAL 2 TIMES DAILY
Qty: 60 TABLET | Refills: 0 | Status: SHIPPED | OUTPATIENT
Start: 2021-12-06 | End: 2021-12-21 | Stop reason: SDUPTHER

## 2021-12-21 ENCOUNTER — OFFICE VISIT (OUTPATIENT)
Dept: CARDIOLOGY | Facility: CLINIC | Age: 66
End: 2021-12-21

## 2021-12-21 VITALS
OXYGEN SATURATION: 97 % | WEIGHT: 172.75 LBS | SYSTOLIC BLOOD PRESSURE: 157 MMHG | DIASTOLIC BLOOD PRESSURE: 96 MMHG | BODY MASS INDEX: 29.49 KG/M2 | HEART RATE: 66 BPM | HEIGHT: 64 IN

## 2021-12-21 DIAGNOSIS — I20.8 ANGINA AT REST (HCC): ICD-10-CM

## 2021-12-21 DIAGNOSIS — I10 ESSENTIAL HYPERTENSION: ICD-10-CM

## 2021-12-21 DIAGNOSIS — R07.89 CHEST PRESSURE: Primary | ICD-10-CM

## 2021-12-21 DIAGNOSIS — I44.7 LBBB (LEFT BUNDLE BRANCH BLOCK): ICD-10-CM

## 2021-12-21 DIAGNOSIS — R06.02 SOB (SHORTNESS OF BREATH): ICD-10-CM

## 2021-12-21 PROCEDURE — 99214 OFFICE O/P EST MOD 30 MIN: CPT | Performed by: INTERNAL MEDICINE

## 2021-12-21 PROCEDURE — 93000 ELECTROCARDIOGRAM COMPLETE: CPT | Performed by: INTERNAL MEDICINE

## 2021-12-21 RX ORDER — METOPROLOL SUCCINATE 50 MG/1
50 TABLET, EXTENDED RELEASE ORAL 2 TIMES DAILY
Qty: 180 TABLET | Refills: 3 | Status: SHIPPED | OUTPATIENT
Start: 2021-12-21 | End: 2022-06-23 | Stop reason: SDUPTHER

## 2021-12-21 RX ORDER — METOPROLOL SUCCINATE 50 MG/1
TABLET, EXTENDED RELEASE ORAL
Qty: 90 TABLET | Refills: 3 | Status: SHIPPED | OUTPATIENT
Start: 2021-12-21 | End: 2023-02-06

## 2021-12-21 RX ORDER — AMLODIPINE BESYLATE 5 MG/1
5 TABLET ORAL DAILY
COMMUNITY
Start: 2021-12-04

## 2021-12-21 RX ORDER — ROSUVASTATIN CALCIUM 5 MG/1
5 TABLET, COATED ORAL DAILY
COMMUNITY
Start: 2021-09-16

## 2021-12-21 RX ORDER — METOPROLOL SUCCINATE 100 MG/1
TABLET, EXTENDED RELEASE ORAL
Qty: 90 TABLET | Refills: 3 | Status: SHIPPED | OUTPATIENT
Start: 2021-12-21 | End: 2022-12-27

## 2021-12-21 NOTE — PROGRESS NOTES
Encounter Date:12/21/2021  Last seen 5/7/2020      Patient ID: Aleah Coleman is a 66 y.o. female.    Chief Complaint:  Hypertension  Chest discomfort  Dyslipidemia  Coronary artery disease     History of Present Illness  Since I have last seen, the patient has been without any chest discomfort ,shortness of breath, palpitations, dizziness or syncope.  Denies having any headache ,abdominal pain ,nausea, vomiting , diarrhea constipation, loss of weight or loss of appetite.  Denies having any excessive bruising ,hematuria or blood in the stool.    Review of all systems negative except as indicated.    Reviewed ROS.    Assessment and Plan         ]]]]]]]]]]]]]]]]]]  Impression  ==========  -Exertional chest discomfort and shortness of breath suggestive of angina pectoris.-Better     Cardiac catheterization 1/24/2020  Left ventricle size and contractility is normal with ejection fraction of 60%.  Left main coronary artery normal.  Left anterior descending artery has diffuse calcification with mid segment 50% disease.  Circumflex coronary artery has ostial 50% disease.  Codominant vessel.  Right coronary artery is a codominant vessel that has 30 to 40% disease in the proximal segment.     Echocardiogram showed mild diffuse hypocontractility with ejection fraction of 55%.  Stress Cardiolite test abnormal with anterior and apical ischemia.     -Hypertension dyslipidemia.  History of asthma.     -Strong family history of coronary artery disease     -Status post appendectomy back surgery cholecystectomy and hysterectomy.     -Former smoker     -Allergic to penicillin  ==========  Plan  ========  Chronic coronary artery disease.  Patient is not having any angina pectoris or congestive heart failure    Hypertension 157/96  Patient is on amlodipine metoprolol losartan  Increase metoprolol  mg in the morning and 50 mg in the evening    Dyslipidemia-continue Crestor    Medications were reviewed and updated.  Follow-up in  the office in  6 months.    Further plan will depend on patient's progress.  ]]]]]]]]]]]]]]]                            Diagnosis Plan   1. Chest pressure     2. SOB (shortness of breath)     3. LBBB (left bundle branch block)     4. Angina at rest (HCC)     5. Essential hypertension     LAB RESULTS (LAST 7 DAYS)    CBC        BMP        CMP         BNP        TROPONIN        CoAg        Creatinine Clearance  CrCl cannot be calculated (Patient's most recent lab result is older than the maximum 30 days allowed.).    ABG        Radiology  No radiology results for the last day                The following portions of the patient's history were reviewed and updated as appropriate: allergies, current medications, past family history, past medical history, past social history, past surgical history and problem list.    Review of Systems   Constitutional: Negative for fever and malaise/fatigue.   HENT: Negative for ear pain and nosebleeds.    Eyes: Negative for blurred vision and double vision.   Cardiovascular: Negative for chest pain, dyspnea on exertion and palpitations.   Respiratory: Negative for cough and shortness of breath.    Skin: Negative for rash.   Musculoskeletal: Negative for joint pain.   Gastrointestinal: Negative for abdominal pain, nausea and vomiting.   Neurological: Negative for focal weakness and headaches.   Psychiatric/Behavioral: Negative for depression. The patient is not nervous/anxious.    All other systems reviewed and are negative.        Current Outpatient Medications:   •  albuterol sulfate  (90 Base) MCG/ACT inhaler, Inhale 2 puffs Every 4 (Four) Hours As Needed for Wheezing., Disp: , Rfl:   •  amLODIPine (NORVASC) 5 MG tablet, Take 5 mg by mouth Daily., Disp: , Rfl:   •  aspirin 81 MG EC tablet, Take 81 mg by mouth Daily., Disp: , Rfl:   •  escitalopram (LEXAPRO) 20 MG tablet, Take 1 tablet by mouth Daily., Disp: , Rfl:   •  losartan (COZAAR) 50 MG tablet, Take 1 tablet by mouth 2  (Two) Times a Day., Disp: , Rfl:   •  metoprolol succinate XL (TOPROL-XL) 50 MG 24 hr tablet, Take 1 tablet by mouth 2 (Two) Times a Day., Disp: 180 tablet, Rfl: 3  •  Multiple Vitamins-Minerals (CENTRUM SILVER 50+WOMEN PO), Take 1 tablet by mouth Daily., Disp: , Rfl:   •  rosuvastatin (CRESTOR) 5 MG tablet, Take 5 mg by mouth Daily., Disp: , Rfl:   •  TRELEGY ELLIPTA 100-62.5-25 MCG/INH aerosol powder , Inhale 1 puff Daily., Disp: , Rfl:     Allergies   Allergen Reactions   • Penicillin G Other (See Comments)   • Vancomycin Swelling       Family History   Problem Relation Age of Onset   • Stroke Mother    • No Known Problems Father    • Heart attack Sister    • Heart disease Sister    • Heart disease Brother    • No Known Problems Maternal Aunt    • No Known Problems Maternal Uncle    • No Known Problems Paternal Aunt    • No Known Problems Paternal Uncle    • Heart disease Maternal Grandmother    • Heart disease Maternal Grandfather    • No Known Problems Paternal Grandmother    • No Known Problems Paternal Grandfather    • No Known Problems Other    • Heart disease Brother    • Heart disease Brother    • Anemia Neg Hx    • Arrhythmia Neg Hx    • Asthma Neg Hx    • Clotting disorder Neg Hx    • Fainting Neg Hx    • Heart failure Neg Hx    • Hyperlipidemia Neg Hx    • Hypertension Neg Hx        Past Surgical History:   Procedure Laterality Date   • APPENDECTOMY     • BACK SURGERY      Disc   • CARDIAC CATHETERIZATION N/A 1/24/2020    Procedure: Left Heart Cath with Coronary Angiography;  Surgeon: Brett Zhou MD;  [facility];  Service: Cardiovascular   • CARDIAC CATHETERIZATION N/A 1/24/2020    Procedure: Left ventriculography;  Surgeon: Brett Zhou MD;  [facility];  Service: Cardiovascular   • CHOLECYSTECTOMY     • HYSTERECTOMY         Past Medical History:   Diagnosis Date   • Asthma    • COPD (chronic obstructive pulmonary disease) ([facility])    •  "Hyperlipidemia    • Hypertension    • Sleep apnea     on a cpap since 2019       Family History   Problem Relation Age of Onset   • Stroke Mother    • No Known Problems Father    • Heart attack Sister    • Heart disease Sister    • Heart disease Brother    • No Known Problems Maternal Aunt    • No Known Problems Maternal Uncle    • No Known Problems Paternal Aunt    • No Known Problems Paternal Uncle    • Heart disease Maternal Grandmother    • Heart disease Maternal Grandfather    • No Known Problems Paternal Grandmother    • No Known Problems Paternal Grandfather    • No Known Problems Other    • Heart disease Brother    • Heart disease Brother    • Anemia Neg Hx    • Arrhythmia Neg Hx    • Asthma Neg Hx    • Clotting disorder Neg Hx    • Fainting Neg Hx    • Heart failure Neg Hx    • Hyperlipidemia Neg Hx    • Hypertension Neg Hx        Social History     Socioeconomic History   • Marital status:    Tobacco Use   • Smoking status: Former Smoker     Quit date:      Years since quittin.9   • Smokeless tobacco: Never Used   Vaping Use   • Vaping Use: Never used   Substance and Sexual Activity   • Alcohol use: Yes     Alcohol/week: 1.0 standard drink     Types: 1 Glasses of wine per week   • Drug use: Never     Comment: CBD oils   • Sexual activity: Defer           ECG 12 Lead    Date/Time: 2021 11:24 AM  Performed by: Brett Zhou MD  Authorized by: Brett Zhou MD   Comparison: compared with previous ECG   Similar to previous ECG  Comparison to previous ECG: Sinus bradycardia 59/min right axis deviation nonspecific ST-T wave changes intraventricular conduction delay of left bundle branch block type.  No significant change from 2020                Objective:       Physical Exam    /96   Pulse 66   Ht 162.6 cm (64\")   Wt 78.4 kg (172 lb 12 oz)   SpO2 97%   BMI 29.65 kg/m²   The patient is alert, oriented and in no distress.    Vital signs as noted above.    Head and " neck revealed no carotid bruits or jugular venous distension.  No thyromegaly or lymphadenopathy is present.    Lungs clear.  No wheezing.  Breath sounds are normal bilaterally.    Heart normal first and second heart sounds.  No murmur..  No pericardial rub is present.  No gallop is present.    Abdomen soft and nontender.  No organomegaly is present.    Extremities revealed good peripheral pulses without any pedal edema.    Skin warm and dry.    Musculoskeletal system is grossly normal.    CNS grossly normal.    Reviewed and unchanged from last visit.

## 2021-12-27 ENCOUNTER — TRANSCRIBE ORDERS (OUTPATIENT)
Dept: ADMINISTRATIVE | Facility: HOSPITAL | Age: 66
End: 2021-12-27

## 2021-12-27 DIAGNOSIS — Z78.0 POSTMENOPAUSAL: Primary | ICD-10-CM

## 2021-12-27 DIAGNOSIS — Z12.31 ENCOUNTER FOR SCREENING MAMMOGRAM FOR MALIGNANT NEOPLASM OF BREAST: ICD-10-CM

## 2022-01-26 ENCOUNTER — HOSPITAL ENCOUNTER (OUTPATIENT)
Dept: BONE DENSITY | Facility: HOSPITAL | Age: 67
Discharge: HOME | End: 2022-01-26

## 2022-01-26 ENCOUNTER — HOSPITAL ENCOUNTER (OUTPATIENT)
Dept: MAMMOGRAPHY | Facility: HOSPITAL | Age: 67
Discharge: HOME | End: 2022-01-26

## 2022-01-26 DIAGNOSIS — Z12.31 ENCOUNTER FOR SCREENING MAMMOGRAM FOR MALIGNANT NEOPLASM OF BREAST: ICD-10-CM

## 2022-01-26 DIAGNOSIS — Z78.0 POSTMENOPAUSAL: ICD-10-CM

## 2022-01-26 PROCEDURE — 77063 BREAST TOMOSYNTHESIS BI: CPT

## 2022-01-26 PROCEDURE — 77067 SCR MAMMO BI INCL CAD: CPT

## 2022-01-26 PROCEDURE — 77080 DXA BONE DENSITY AXIAL: CPT

## 2022-06-23 ENCOUNTER — OFFICE VISIT (OUTPATIENT)
Dept: CARDIOLOGY | Facility: CLINIC | Age: 67
End: 2022-06-23

## 2022-06-23 VITALS
HEART RATE: 62 BPM | BODY MASS INDEX: 29.53 KG/M2 | DIASTOLIC BLOOD PRESSURE: 85 MMHG | HEIGHT: 64 IN | WEIGHT: 173 LBS | OXYGEN SATURATION: 97 % | SYSTOLIC BLOOD PRESSURE: 151 MMHG

## 2022-06-23 DIAGNOSIS — I20.8 ANGINA AT REST: ICD-10-CM

## 2022-06-23 DIAGNOSIS — I10 ESSENTIAL HYPERTENSION: ICD-10-CM

## 2022-06-23 DIAGNOSIS — R06.02 SOB (SHORTNESS OF BREATH): ICD-10-CM

## 2022-06-23 DIAGNOSIS — I44.7 LBBB (LEFT BUNDLE BRANCH BLOCK): ICD-10-CM

## 2022-06-23 DIAGNOSIS — R07.89 CHEST PRESSURE: Primary | ICD-10-CM

## 2022-06-23 PROCEDURE — 93000 ELECTROCARDIOGRAM COMPLETE: CPT | Performed by: INTERNAL MEDICINE

## 2022-06-23 PROCEDURE — 99214 OFFICE O/P EST MOD 30 MIN: CPT | Performed by: INTERNAL MEDICINE

## 2022-06-23 NOTE — PROGRESS NOTES
Encounter Date:06/23/2022  Last seen 12/21/2021      Patient ID: Aleah Coleman is a 67 y.o. female.    Chief Complaint:  Hypertension  Chest discomfort  Dyslipidemia  Coronary artery disease  Left bundle branch block     History of Present Illness  Since I have last seen, the patient has been without any chest discomfort ,shortness of breath, palpitations, dizziness or syncope.  Denies having any headache ,abdominal pain ,nausea, vomiting , diarrhea constipation, loss of weight or loss of appetite.  Denies having any excessive bruising ,hematuria or blood in the stool.    Review of all systems negative except as indicated.    Reviewed ROS.   Assessment and Plan         ]]]]]]]]]]]]]]]]]]  Impression  ==========  -Exertional chest discomfort and shortness of breath suggestive of angina pectoris.-Better    -Chronic coronary artery disease     Cardiac catheterization 1/24/2020  Left ventricle size and contractility is normal with ejection fraction of 60%.  Left main coronary artery normal.  Left anterior descending artery has diffuse calcification with mid segment 50% disease.  Circumflex coronary artery has ostial 50% disease.  Codominant vessel.  Right coronary artery is a codominant vessel that has 30 to 40% disease in the proximal segment.     Echocardiogram showed mild diffuse hypocontractility with ejection fraction of 55%.  Stress Cardiolite test abnormal with anterior and apical ischemia.     -Hypertension dyslipidemia.  History of asthma.    -Chronic left bundle branch block     -Strong family history of coronary artery disease     -Status post appendectomy back surgery cholecystectomy and hysterectomy.     -Former smoker     -Allergic to penicillin  ==========  Plan  ========  Chronic coronary artery disease.  Patient is not having any angina pectoris or congestive heart failure     Hypertension 150/80  Patient is on amlodipine metoprolol losartan  Continue metoprolol  mg in the morning and 50 mg in  the evening     Dyslipidemia-continue Crestor    Chronic left bundle branch block- stable and asymptomatic     Medications were reviewed and updated.    Follow-up in the office in  6 months.     Further plan will depend on patient's progress.  ]]]]]]]]]]]]]]]                           Diagnosis Plan   1. Chest pressure  ECG 12 Lead   2. SOB (shortness of breath)  ECG 12 Lead   3. LBBB (left bundle branch block)  ECG 12 Lead   4. Essential hypertension  ECG 12 Lead   5. Angina at rest (HCC)     LAB RESULTS (LAST 7 DAYS)    CBC        BMP        CMP         BNP        TROPONIN        CoAg        Creatinine Clearance  CrCl cannot be calculated (Patient's most recent lab result is older than the maximum 30 days allowed.).    ABG        Radiology  No radiology results for the last day                The following portions of the patient's history were reviewed and updated as appropriate: allergies, current medications, past family history, past medical history, past social history, past surgical history and problem list.    Review of Systems   Constitutional: Negative for fever and malaise/fatigue.   Cardiovascular: Negative for chest pain, dyspnea on exertion and palpitations.   Respiratory: Positive for shortness of breath. Negative for cough.    Skin: Negative for rash.   Gastrointestinal: Negative for abdominal pain, nausea and vomiting.   Neurological: Positive for dizziness. Negative for focal weakness and headaches.   All other systems reviewed and are negative.        Current Outpatient Medications:   •  albuterol sulfate  (90 Base) MCG/ACT inhaler, Inhale 2 puffs Every 4 (Four) Hours As Needed for Wheezing., Disp: , Rfl:   •  amLODIPine (NORVASC) 5 MG tablet, Take 5 mg by mouth Daily., Disp: , Rfl:   •  aspirin 81 MG EC tablet, Take 81 mg by mouth Daily., Disp: , Rfl:   •  escitalopram (LEXAPRO) 20 MG tablet, Take 1 tablet by mouth Daily., Disp: , Rfl:   •  losartan (COZAAR) 50 MG tablet, Take 1 tablet by  mouth 2 (Two) Times a Day., Disp: , Rfl:   •  metoprolol succinate XL (Toprol XL) 100 MG 24 hr tablet, Take one tablet by mouth every morning. For a total of 150mg daily., Disp: 90 tablet, Rfl: 3  •  metoprolol succinate XL (TOPROL-XL) 50 MG 24 hr tablet, Take one tablet by mouth every evening. For a total of 150mg daily., Disp: 90 tablet, Rfl: 3  •  Multiple Vitamins-Minerals (CENTRUM SILVER 50+WOMEN PO), Take 1 tablet by mouth Daily., Disp: , Rfl:   •  rosuvastatin (CRESTOR) 5 MG tablet, Take 5 mg by mouth Daily., Disp: , Rfl:   •  TRELEGY ELLIPTA 100-62.5-25 MCG/INH aerosol powder , Inhale 1 puff Daily., Disp: , Rfl:     Allergies   Allergen Reactions   • Penicillin G Other (See Comments)   • Vancomycin Swelling       Family History   Problem Relation Age of Onset   • Stroke Mother    • No Known Problems Father    • Heart attack Sister    • Heart disease Sister    • Heart disease Brother    • Ovarian cancer Maternal Aunt    • No Known Problems Maternal Uncle    • No Known Problems Paternal Aunt    • No Known Problems Paternal Uncle    • Heart disease Maternal Grandmother    • Heart disease Maternal Grandfather    • No Known Problems Paternal Grandmother    • No Known Problems Paternal Grandfather    • No Known Problems Other    • Heart disease Brother    • Heart disease Brother    • Anemia Neg Hx    • Arrhythmia Neg Hx    • Asthma Neg Hx    • Clotting disorder Neg Hx    • Fainting Neg Hx    • Heart failure Neg Hx    • Hyperlipidemia Neg Hx    • Hypertension Neg Hx        Past Surgical History:   Procedure Laterality Date   • APPENDECTOMY     • BACK SURGERY      Disc   • CARDIAC CATHETERIZATION N/A 01/24/2020    Procedure: Left Heart Cath with Coronary Angiography;  Surgeon: Brett Zhou MD;  Location: [facility] CATH INVASIVE LOCATION;  Service: Cardiovascular   • CARDIAC CATHETERIZATION N/A 01/24/2020    Procedure: Left ventriculography;  Surgeon: Brett Zhou MD;  Location: [facility] CATH INVASIVE LOCATION;   Service: Cardiovascular   • CHOLECYSTECTOMY     • HYSTERECTOMY         Past Medical History:   Diagnosis Date   • Abnormal ECG Don’t  know   • Asthma    • COPD (chronic obstructive pulmonary disease) (HCC)    • Hyperlipidemia    • Hypertension    • Sleep apnea     on a cpap since 2019       Family History   Problem Relation Age of Onset   • Stroke Mother    • No Known Problems Father    • Heart attack Sister    • Heart disease Sister    • Heart disease Brother    • Ovarian cancer Maternal Aunt    • No Known Problems Maternal Uncle    • No Known Problems Paternal Aunt    • No Known Problems Paternal Uncle    • Heart disease Maternal Grandmother    • Heart disease Maternal Grandfather    • No Known Problems Paternal Grandmother    • No Known Problems Paternal Grandfather    • No Known Problems Other    • Heart disease Brother    • Heart disease Brother    • Anemia Neg Hx    • Arrhythmia Neg Hx    • Asthma Neg Hx    • Clotting disorder Neg Hx    • Fainting Neg Hx    • Heart failure Neg Hx    • Hyperlipidemia Neg Hx    • Hypertension Neg Hx        Social History     Socioeconomic History   • Marital status:    Tobacco Use   • Smoking status: Former Smoker     Packs/day: 3.00     Years: 20.00     Pack years: 60.00     Types: Cigarettes     Quit date: 1988     Years since quittin.4   • Smokeless tobacco: Never Used   Vaping Use   • Vaping Use: Never used   Substance and Sexual Activity   • Alcohol use: Not Currently     Alcohol/week: 1.0 standard drink     Types: 1 Glasses of wine per week     Comment: 1-2 glass a month   • Drug use: Never     Comment: CBD oils   • Sexual activity: Not Currently     Partners: Male     Birth control/protection: Post-menopausal           ECG 12 Lead    Date/Time: 2022 9:53 AM  Performed by: Brett Zhou MD  Authorized by: Brett Zhou MD   Comparison: compared with previous ECG   Similar to previous ECG  Comparison to previous ECG: Sinus rhythm left bundle  "branch block 57/min nonspecific ST-T wave changes compared to 12/21/2021 no significant changes seen                Objective:       Physical Exam    /85   Pulse 62   Ht 162.6 cm (64\")   Wt 78.5 kg (173 lb)   SpO2 97%   BMI 29.70 kg/m²   The patient is alert, oriented and in no distress.    Vital signs as noted above.    Head and neck revealed no carotid bruits or jugular venous distension.  No thyromegaly or lymphadenopathy is present.    Lungs clear.  No wheezing.  Breath sounds are normal bilaterally.    Heart normal first and second heart sounds.  No murmur..  No pericardial rub is present.  No gallop is present.    Abdomen soft and nontender.  No organomegaly is present.    Extremities revealed good peripheral pulses without any pedal edema.    Skin warm and dry.    Musculoskeletal system is grossly normal.    CNS grossly normal.    Reviewed and updated.        "

## 2022-08-11 PROBLEM — Z86.010 PERSONAL HISTORY OF COLONIC POLYPS: Status: ACTIVE | Noted: 2017-07-31

## 2022-09-20 ENCOUNTER — OFFICE (AMBULATORY)
Dept: URBAN - METROPOLITAN AREA PATHOLOGY 4 | Facility: PATHOLOGY | Age: 67
End: 2022-09-20
Payer: COMMERCIAL

## 2022-09-20 ENCOUNTER — ON CAMPUS - OUTPATIENT (AMBULATORY)
Dept: URBAN - METROPOLITAN AREA HOSPITAL 2 | Facility: HOSPITAL | Age: 67
End: 2022-09-20
Payer: COMMERCIAL

## 2022-09-20 VITALS
HEART RATE: 71 BPM | SYSTOLIC BLOOD PRESSURE: 130 MMHG | OXYGEN SATURATION: 99 % | OXYGEN SATURATION: 94 % | HEART RATE: 76 BPM | SYSTOLIC BLOOD PRESSURE: 129 MMHG | SYSTOLIC BLOOD PRESSURE: 110 MMHG | WEIGHT: 157 LBS | TEMPERATURE: 96.8 F | DIASTOLIC BLOOD PRESSURE: 68 MMHG | DIASTOLIC BLOOD PRESSURE: 50 MMHG | OXYGEN SATURATION: 97 % | HEART RATE: 64 BPM | SYSTOLIC BLOOD PRESSURE: 87 MMHG | HEIGHT: 62 IN | OXYGEN SATURATION: 96 % | DIASTOLIC BLOOD PRESSURE: 51 MMHG | HEART RATE: 66 BPM | DIASTOLIC BLOOD PRESSURE: 89 MMHG | HEART RATE: 65 BPM | DIASTOLIC BLOOD PRESSURE: 64 MMHG | SYSTOLIC BLOOD PRESSURE: 105 MMHG | SYSTOLIC BLOOD PRESSURE: 125 MMHG | RESPIRATION RATE: 16 BRPM | OXYGEN SATURATION: 100 % | SYSTOLIC BLOOD PRESSURE: 112 MMHG | DIASTOLIC BLOOD PRESSURE: 62 MMHG | HEART RATE: 73 BPM | DIASTOLIC BLOOD PRESSURE: 82 MMHG

## 2022-09-20 DIAGNOSIS — D12.8 BENIGN NEOPLASM OF RECTUM: ICD-10-CM

## 2022-09-20 DIAGNOSIS — Z86.010 PERSONAL HISTORY OF COLONIC POLYPS: ICD-10-CM

## 2022-09-20 PROBLEM — K62.1 RECTAL POLYP: Status: ACTIVE | Noted: 2022-09-20

## 2022-09-20 LAB
GI HISTOLOGY: A. UNSPECIFIED: (no result)
GI HISTOLOGY: PDF REPORT: (no result)

## 2022-09-20 PROCEDURE — 45385 COLONOSCOPY W/LESION REMOVAL: CPT | Mod: PT | Performed by: INTERNAL MEDICINE

## 2022-09-20 PROCEDURE — 88305 TISSUE EXAM BY PATHOLOGIST: CPT | Mod: 26 | Performed by: INTERNAL MEDICINE

## 2022-11-18 NOTE — TELEPHONE ENCOUNTER
Incoming Refill Request      Medication requested (name and dose): METOPROLOL 50 MG    Pharmacy where request should be sent: [facility]    Additional details provided by patient: MADE APT 12/21/21.    Best call back number: [phone]    Does the patient have less than a 3 day supply:  [x] Yes  [] No    Mell Darden Rep  12/06/21, 11:01 EST           Standing/Walking/Toileting

## 2022-12-27 RX ORDER — METOPROLOL SUCCINATE 100 MG/1
TABLET, EXTENDED RELEASE ORAL
Qty: 90 TABLET | Refills: 3 | Status: SHIPPED | OUTPATIENT
Start: 2022-12-27

## 2022-12-27 NOTE — TELEPHONE ENCOUNTER
Rx Refill Note  Requested Prescriptions     Pending Prescriptions Disp Refills   • metoprolol succinate XL (TOPROL-XL) 100 MG 24 hr tablet [Pharmacy Med Name: METOPROL SUC TAB 100MG ER] 90 tablet 3     Sig: TAKE 1 TABLET EVERY MORNINGFOR A TOTAL OF 150MG DAILY      Last office visit with prescribing clinician: 6/23/2022   Last telemedicine visit with prescribing clinician: 1/12/2023   Next office visit with prescribing clinician: 1/12/2023                         Would you like a call back once the refill request has been completed: [] Yes [] No    If the office needs to give you a call back, can they leave a voicemail: [] Yes [] No    Mavis Christie MA  12/27/22, 07:56 EST

## 2023-01-12 ENCOUNTER — TRANSCRIBE ORDERS (OUTPATIENT)
Dept: ADMINISTRATIVE | Facility: HOSPITAL | Age: 68
End: 2023-01-12
Payer: MEDICARE

## 2023-01-12 ENCOUNTER — OFFICE VISIT (OUTPATIENT)
Dept: CARDIOLOGY | Facility: CLINIC | Age: 68
End: 2023-01-12
Payer: MEDICARE

## 2023-01-12 VITALS
WEIGHT: 162 LBS | HEIGHT: 64 IN | BODY MASS INDEX: 27.66 KG/M2 | OXYGEN SATURATION: 97 % | HEART RATE: 59 BPM | DIASTOLIC BLOOD PRESSURE: 77 MMHG | SYSTOLIC BLOOD PRESSURE: 120 MMHG

## 2023-01-12 DIAGNOSIS — R07.89 CHEST PRESSURE: Primary | ICD-10-CM

## 2023-01-12 DIAGNOSIS — I20.8 ANGINA AT REST: ICD-10-CM

## 2023-01-12 DIAGNOSIS — I10 ESSENTIAL HYPERTENSION: ICD-10-CM

## 2023-01-12 DIAGNOSIS — R06.02 SOB (SHORTNESS OF BREATH): ICD-10-CM

## 2023-01-12 DIAGNOSIS — Z12.39 SCREENING BREAST EXAMINATION: Primary | ICD-10-CM

## 2023-01-12 DIAGNOSIS — I44.7 LBBB (LEFT BUNDLE BRANCH BLOCK): ICD-10-CM

## 2023-01-12 PROCEDURE — 93000 ELECTROCARDIOGRAM COMPLETE: CPT | Performed by: INTERNAL MEDICINE

## 2023-01-12 PROCEDURE — 99214 OFFICE O/P EST MOD 30 MIN: CPT | Performed by: INTERNAL MEDICINE

## 2023-01-12 RX ORDER — MONTELUKAST SODIUM 10 MG/1
TABLET ORAL
COMMUNITY
Start: 2023-01-11

## 2023-01-12 NOTE — PROGRESS NOTES
Encounter Date:01/12/2023  Last seen 6/23/2022      Patient ID: Aleah Coleman is a 68 y.o. female.    Chief Complaint:  Hypertension  Chest discomfort  Dyslipidemia  Coronary artery disease  Left bundle branch block     History of Present Illness  Since I have last seen, the patient has been without any chest discomfort ,shortness of breath, palpitations, dizziness or syncope.  Denies having any headache ,abdominal pain ,nausea, vomiting , diarrhea constipation, loss of weight or loss of appetite.  Denies having any excessive bruising ,hematuria or blood in the stool.    Review of all systems negative except as indicated.    Reviewed ROS.  Assessment and Plan         ]]]]]]]]]]]]]]]]]]  Impression  ==========  -Exertional chest discomfort and shortness of breath suggestive of angina pectoris.-Better     -Chronic coronary artery disease     Cardiac catheterization 1/24/2020  Left ventricle size and contractility is normal with ejection fraction of 60%.  Left main coronary artery normal.  Left anterior descending artery has diffuse calcification with mid segment 50% disease.  Circumflex coronary artery has ostial 50% disease.  Codominant vessel.  Right coronary artery is a codominant vessel that has 30 to 40% disease in the proximal segment.     Echocardiogram showed mild diffuse hypocontractility with ejection fraction of 55%.  Stress Cardiolite test abnormal with anterior and apical ischemia.     -Hypertension dyslipidemia.  History of asthma.     -Chronic left bundle branch block     -Strong family history of coronary artery disease     -Status post appendectomy back surgery cholecystectomy and hysterectomy.     -Former smoker     -Allergic to penicillin  ==========  Plan  ========  Chronic coronary artery disease.  Patient is not having any angina pectoris or congestive heart failure.  EKG showed sinus rhythm left bundle branch block 57/min right axis deviation no ectopy no significant change from  6/23/2022     Hypertension  120/77  Patient is on amlodipine metoprolol losartan  Continue metoprolol XL 100 mg in the morning and 50 mg in the evening     Dyslipidemia-continue Crestor     Chronic left bundle branch block- stable and asymptomatic     Medications were reviewed and updated.     Follow-up in the office in  6 months.     Further plan will depend on patient's progress.  ]]]]]]]]]]]]]]]                         Diagnosis Plan   1. Chest pressure  ECG 12 Lead      2. SOB (shortness of breath)  ECG 12 Lead      3. LBBB (left bundle branch block)  ECG 12 Lead      4. Angina at rest (HCC)  ECG 12 Lead      5. Essential hypertension  ECG 12 Lead      LAB RESULTS (LAST 7 DAYS)    CBC        BMP        CMP         BNP        TROPONIN        CoAg        Creatinine Clearance  CrCl cannot be calculated (Patient's most recent lab result is older than the maximum 30 days allowed.).    ABG        Radiology  No radiology results for the last day                The following portions of the patient's history were reviewed and updated as appropriate: allergies, current medications, past family history, past medical history, past social history, past surgical history and problem list.    Review of Systems   Constitutional: Negative for malaise/fatigue.   Cardiovascular: Negative for chest pain, dyspnea on exertion, leg swelling and palpitations.   Respiratory: Negative for cough and shortness of breath.    Gastrointestinal: Negative for abdominal pain, nausea and vomiting.   Neurological: Negative for dizziness, focal weakness, headaches, light-headedness and numbness.   All other systems reviewed and are negative.        Current Outpatient Medications:   •  albuterol sulfate  (90 Base) MCG/ACT inhaler, Inhale 2 puffs Every 4 (Four) Hours As Needed for Wheezing., Disp: , Rfl:   •  amLODIPine (NORVASC) 5 MG tablet, Take 5 mg by mouth Daily., Disp: , Rfl:   •  aspirin 81 MG EC tablet, Take 81 mg by mouth Daily.,  Disp: , Rfl:   •  escitalopram (LEXAPRO) 20 MG tablet, Take 1 tablet by mouth Daily., Disp: , Rfl:   •  losartan (COZAAR) 50 MG tablet, Take 1 tablet by mouth 2 (Two) Times a Day., Disp: , Rfl:   •  metoprolol succinate XL (TOPROL-XL) 100 MG 24 hr tablet, TAKE 1 TABLET EVERY MORNINGFOR A TOTAL OF 150MG DAILY, Disp: 90 tablet, Rfl: 3  •  metoprolol succinate XL (TOPROL-XL) 50 MG 24 hr tablet, Take one tablet by mouth every evening. For a total of 150mg daily., Disp: 90 tablet, Rfl: 3  •  montelukast (SINGULAIR) 10 MG tablet, , Disp: , Rfl:   •  Multiple Vitamins-Minerals (CENTRUM SILVER 50+WOMEN PO), Take 1 tablet by mouth Daily., Disp: , Rfl:   •  rosuvastatin (CRESTOR) 5 MG tablet, Take 5 mg by mouth Daily., Disp: , Rfl:   •  TRELEGY ELLIPTA 100-62.5-25 MCG/INH aerosol powder , Inhale 1 puff Daily., Disp: , Rfl:     Allergies   Allergen Reactions   • Penicillin G Other (See Comments)   • Vancomycin Swelling       Family History   Problem Relation Age of Onset   • Stroke Mother    • No Known Problems Father    • Heart attack Sister    • Heart disease Sister    • Heart disease Brother    • Ovarian cancer Maternal Aunt    • No Known Problems Maternal Uncle    • No Known Problems Paternal Aunt    • No Known Problems Paternal Uncle    • Heart disease Maternal Grandmother    • Heart disease Maternal Grandfather    • No Known Problems Paternal Grandmother    • No Known Problems Paternal Grandfather    • No Known Problems Other    • Heart disease Brother    • Heart disease Brother    • Anemia Neg Hx    • Arrhythmia Neg Hx    • Asthma Neg Hx    • Clotting disorder Neg Hx    • Fainting Neg Hx    • Heart failure Neg Hx    • Hyperlipidemia Neg Hx    • Hypertension Neg Hx        Past Surgical History:   Procedure Laterality Date   • APPENDECTOMY     • BACK SURGERY      Disc   • CARDIAC CATHETERIZATION N/A 01/24/2020    Procedure: Left Heart Cath with Coronary Angiography;  Surgeon: Brett Zhou MD;  Location: [facility]  INVASIVE LOCATION;  Service: Cardiovascular   • CARDIAC CATHETERIZATION N/A 2020    Procedure: Left ventriculography;  Surgeon: Brett Zhou MD;  Location: [facility] INVASIVE LOCATION;  Service: Cardiovascular   • CHOLECYSTECTOMY     • HYSTERECTOMY         Past Medical History:   Diagnosis Date   • Abnormal ECG Don’t  know   • Asthma    • COPD (chronic obstructive pulmonary disease) ([facility])    • Hyperlipidemia    • Hypertension    • Sleep apnea     on a cpap since 2019       Family History   Problem Relation Age of Onset   • Stroke Mother    • No Known Problems Father    • Heart attack Sister    • Heart disease Sister    • Heart disease Brother    • Ovarian cancer Maternal Aunt    • No Known Problems Maternal Uncle    • No Known Problems Paternal Aunt    • No Known Problems Paternal Uncle    • Heart disease Maternal Grandmother    • Heart disease Maternal Grandfather    • No Known Problems Paternal Grandmother    • No Known Problems Paternal Grandfather    • No Known Problems Other    • Heart disease Brother    • Heart disease Brother    • Anemia Neg Hx    • Arrhythmia Neg Hx    • Asthma Neg Hx    • Clotting disorder Neg Hx    • Fainting Neg Hx    • Heart failure Neg Hx    • Hyperlipidemia Neg Hx    • Hypertension Neg Hx        Social History     Socioeconomic History   • Marital status:    Tobacco Use   • Smoking status: Former     Packs/day: 3.00     Years: 20.00     Pack years: 60.00     Types: Cigarettes     Quit date: 1988     Years since quittin.0   • Smokeless tobacco: Never   Vaping Use   • Vaping Use: Never used   Substance and Sexual Activity   • Alcohol use: Not Currently     Alcohol/week: 1.0 standard drink     Types: 1 Glasses of wine per week     Comment: 1-2 glass a month   • Drug use: Never     Comment: CBD oils   • Sexual activity: Not Currently     Partners: Male     Birth control/protection: Post-menopausal, Tubal ligation           ECG 12 Lead    Date/Time: 2023  "9:54 AM  Performed by: Brett Zhou MD  Authorized by: Brett Zhou MD   Comparison: compared with previous ECG   Similar to previous ECG  Comparison to previous ECG: EKG showed sinus rhythm left bundle branch block 57/min right axis deviation no ectopy no significant change from 6/23/2022                  Objective:       Physical Exam    /77 (BP Location: Right arm, Patient Position: Sitting, Cuff Size: Adult)   Pulse 59   Ht 162.6 cm (64\")   Wt 73.5 kg (162 lb)   SpO2 97%   BMI 27.81 kg/m²   The patient is alert, oriented and in no distress.    Vital signs as noted above.    Head and neck revealed no carotid bruits or jugular venous distension.  No thyromegaly or lymphadenopathy is present.    Lungs clear.  No wheezing.  Breath sounds are normal bilaterally.    Heart normal first and second heart sounds.  No murmur..  No pericardial rub is present.  No gallop is present.    Abdomen soft and nontender.  No organomegaly is present.    Extremities revealed good peripheral pulses without any pedal edema.    Skin warm and dry.    Musculoskeletal system is grossly normal.    CNS grossly normal.    Reviewed and updated.        "

## 2023-02-03 ENCOUNTER — HOSPITAL ENCOUNTER (OUTPATIENT)
Dept: MAMMOGRAPHY | Facility: HOSPITAL | Age: 68
Discharge: HOME | End: 2023-02-03
Admitting: FAMILY MEDICINE
Payer: MEDICARE

## 2023-02-03 DIAGNOSIS — Z12.39 SCREENING BREAST EXAMINATION: ICD-10-CM

## 2023-02-03 PROCEDURE — 77067 SCR MAMMO BI INCL CAD: CPT

## 2023-02-03 PROCEDURE — 77063 BREAST TOMOSYNTHESIS BI: CPT

## 2023-02-06 RX ORDER — METOPROLOL SUCCINATE 50 MG/1
TABLET, EXTENDED RELEASE ORAL
Qty: 90 TABLET | Refills: 3 | Status: SHIPPED | OUTPATIENT
Start: 2023-02-06

## 2023-05-01 ENCOUNTER — HOSPITAL ENCOUNTER (EMERGENCY)
Facility: HOSPITAL | Age: 68
Discharge: HOME | End: 2023-05-01
Attending: EMERGENCY MEDICINE | Admitting: EMERGENCY MEDICINE
Payer: MEDICARE

## 2023-05-01 ENCOUNTER — APPOINTMENT (OUTPATIENT)
Dept: CT IMAGING | Facility: HOSPITAL | Age: 68
End: 2023-05-01
Payer: MEDICARE

## 2023-05-01 VITALS
DIASTOLIC BLOOD PRESSURE: 72 MMHG | HEART RATE: 62 BPM | SYSTOLIC BLOOD PRESSURE: 140 MMHG | HEIGHT: 62 IN | WEIGHT: 164.02 LBS | BODY MASS INDEX: 30.18 KG/M2 | OXYGEN SATURATION: 97 % | RESPIRATION RATE: 14 BRPM | TEMPERATURE: 98.4 F

## 2023-05-01 DIAGNOSIS — R10.9 ABDOMINAL PAIN, UNSPECIFIED ABDOMINAL LOCATION: Primary | ICD-10-CM

## 2023-05-01 LAB
ALBUMIN SERPL-MCNC: 4.7 G/DL (ref 3.5–5.2)
ALBUMIN/GLOB SERPL: 1.7 G/DL
ALP SERPL-CCNC: 92 U/L (ref 39–117)
ALT SERPL W P-5'-P-CCNC: 28 U/L (ref 1–33)
ANION GAP SERPL CALCULATED.3IONS-SCNC: 11 MMOL/L (ref 5–15)
AST SERPL-CCNC: 28 U/L (ref 1–32)
BACTERIA UR QL AUTO: ABNORMAL /HPF
BASOPHILS # BLD AUTO: 0.1 10*3/MM3 (ref 0–0.2)
BASOPHILS NFR BLD AUTO: 1.1 % (ref 0–1.5)
BILIRUB SERPL-MCNC: 0.4 MG/DL (ref 0–1.2)
BILIRUB UR QL STRIP: NEGATIVE
BUN SERPL-MCNC: 16 MG/DL (ref 8–23)
BUN/CREAT SERPL: 21.6 (ref 7–25)
CALCIUM SPEC-SCNC: 9.8 MG/DL (ref 8.6–10.5)
CHLORIDE SERPL-SCNC: 103 MMOL/L (ref 98–107)
CLARITY UR: CLEAR
CO2 SERPL-SCNC: 28 MMOL/L (ref 22–29)
COLOR UR: YELLOW
CREAT SERPL-MCNC: 0.74 MG/DL (ref 0.57–1)
DEPRECATED RDW RBC AUTO: 45.5 FL (ref 37–54)
EGFRCR SERPLBLD CKD-EPI 2021: 88.8 ML/MIN/1.73
EOSINOPHIL # BLD AUTO: 0.2 10*3/MM3 (ref 0–0.4)
EOSINOPHIL NFR BLD AUTO: 2.3 % (ref 0.3–6.2)
ERYTHROCYTE [DISTWIDTH] IN BLOOD BY AUTOMATED COUNT: 13.6 % (ref 12.3–15.4)
GLOBULIN UR ELPH-MCNC: 2.8 GM/DL
GLUCOSE SERPL-MCNC: 99 MG/DL (ref 65–99)
GLUCOSE UR STRIP-MCNC: NEGATIVE MG/DL
HCT VFR BLD AUTO: 41.5 % (ref 34–46.6)
HGB BLD-MCNC: 14.3 G/DL (ref 12–15.9)
HGB UR QL STRIP.AUTO: ABNORMAL
HYALINE CASTS UR QL AUTO: ABNORMAL /LPF
KETONES UR QL STRIP: NEGATIVE
LEUKOCYTE ESTERASE UR QL STRIP.AUTO: ABNORMAL
LIPASE SERPL-CCNC: 33 U/L (ref 13–60)
LYMPHOCYTES # BLD AUTO: 1.8 10*3/MM3 (ref 0.7–3.1)
LYMPHOCYTES NFR BLD AUTO: 26.9 % (ref 19.6–45.3)
MCH RBC QN AUTO: 31.3 PG (ref 26.6–33)
MCHC RBC AUTO-ENTMCNC: 34.6 G/DL (ref 31.5–35.7)
MCV RBC AUTO: 90.5 FL (ref 79–97)
MONOCYTES # BLD AUTO: 0.8 10*3/MM3 (ref 0.1–0.9)
MONOCYTES NFR BLD AUTO: 11.5 % (ref 5–12)
NEUTROPHILS NFR BLD AUTO: 4 10*3/MM3 (ref 1.7–7)
NEUTROPHILS NFR BLD AUTO: 58.2 % (ref 42.7–76)
NITRITE UR QL STRIP: NEGATIVE
NRBC BLD AUTO-RTO: 0 /100 WBC (ref 0–0.2)
PH UR STRIP.AUTO: 7.5 [PH] (ref 5–8)
PLATELET # BLD AUTO: 237 10*3/MM3 (ref 140–450)
PMV BLD AUTO: 9 FL (ref 6–12)
POTASSIUM SERPL-SCNC: 4 MMOL/L (ref 3.5–5.2)
PROT SERPL-MCNC: 7.5 G/DL (ref 6–8.5)
PROT UR QL STRIP: NEGATIVE
RBC # BLD AUTO: 4.58 10*6/MM3 (ref 3.77–5.28)
RBC # UR STRIP: ABNORMAL /HPF
REF LAB TEST METHOD: ABNORMAL
SODIUM SERPL-SCNC: 142 MMOL/L (ref 136–145)
SP GR UR STRIP: <=1.005 (ref 1–1.03)
SQUAMOUS #/AREA URNS HPF: ABNORMAL /HPF
UROBILINOGEN UR QL STRIP: ABNORMAL
WBC # UR STRIP: ABNORMAL /HPF
WBC NRBC COR # BLD: 6.8 10*3/MM3 (ref 3.4–10.8)

## 2023-05-01 PROCEDURE — 25510000001 IOPAMIDOL PER 1 ML: Performed by: EMERGENCY MEDICINE

## 2023-05-01 PROCEDURE — 83690 ASSAY OF LIPASE: CPT | Performed by: EMERGENCY MEDICINE

## 2023-05-01 PROCEDURE — 81001 URINALYSIS AUTO W/SCOPE: CPT | Performed by: EMERGENCY MEDICINE

## 2023-05-01 PROCEDURE — 85025 COMPLETE CBC W/AUTO DIFF WBC: CPT | Performed by: EMERGENCY MEDICINE

## 2023-05-01 PROCEDURE — 99283 EMERGENCY DEPT VISIT LOW MDM: CPT

## 2023-05-01 PROCEDURE — 74177 CT ABD & PELVIS W/CONTRAST: CPT

## 2023-05-01 PROCEDURE — 80053 COMPREHEN METABOLIC PANEL: CPT | Performed by: EMERGENCY MEDICINE

## 2023-05-01 RX ORDER — SODIUM CHLORIDE 0.9 % (FLUSH) 0.9 %
10 SYRINGE (ML) INJECTION AS NEEDED
Status: DISCONTINUED | OUTPATIENT
Start: 2023-05-01 | End: 2023-05-01 | Stop reason: HOSPADM

## 2023-05-01 RX ADMIN — IOPAMIDOL 100 ML: 755 INJECTION, SOLUTION INTRAVENOUS at 17:16

## 2023-05-01 NOTE — ED NOTES
Abdomen pain around umbilicus off and on for a month but today it is worse than usual.  Burning and pushing pain.  No increased flautus, diarrhea, nausea or vomiting.  She was at doctors office and they were pushing on it, it hurt at that time but when she left there the pain was less intense.  No established pattern to pain, it is not associated with eating or any other activities that she can recall.

## 2023-05-01 NOTE — ED PROVIDER NOTES
Subjective   History of Present Illness  68-year-old female describes mid lower abdominal pain that was present after she woke up this morning.  She states it is improving now after being seen at an outlying facility after she states she the provider had pushed around on her abdomen while it was initially sore but seem to make it feel better as time went on.  She denies any associated nausea vomiting diarrhea or dysuria.  States she had a normal bowel movement today.  She reports a history of hernia.  He had not noticed any bulging or localized abdominal wall pain today.  Review of Systems  Negative for chest pain or fever or flank pain  Past Medical History:   Diagnosis Date   • Abnormal ECG Don’t  know   • Asthma    • COPD (chronic obstructive pulmonary disease)    • Hyperlipidemia    • Hypertension    • Sleep apnea     on a cpap since 5/2019       Allergies   Allergen Reactions   • Penicillin G Other (See Comments)   • Vancomycin Swelling       Past Surgical History:   Procedure Laterality Date   • APPENDECTOMY     • BACK SURGERY      Disc   • CARDIAC CATHETERIZATION N/A 01/24/2020    Procedure: Left Heart Cath with Coronary Angiography;  Surgeon: Brett Zhou MD;  Location:  [facility];  Service: Cardiovascular   • CARDIAC CATHETERIZATION N/A 01/24/2020    Procedure: Left ventriculography;  Surgeon: Brett Zhou MD;  Location:  [facility];  Service: Cardiovascular   • CHOLECYSTECTOMY     • HYSTERECTOMY         Family History   Problem Relation Age of Onset   • Stroke Mother    • No Known Problems Father    • Heart attack Sister    • Heart disease Sister    • Heart disease Brother    • Ovarian cancer Maternal Aunt    • No Known Problems Maternal Uncle    • No Known Problems Paternal Aunt    • No Known Problems Paternal Uncle    • Heart disease Maternal Grandmother    • Heart disease Maternal Grandfather    • No Known Problems Paternal Grandmother    • No Known  Problems Paternal Grandfather    • No Known Problems Other    • Heart disease Brother    • Heart disease Brother    • Anemia Neg Hx    • Arrhythmia Neg Hx    • Asthma Neg Hx    • Clotting disorder Neg Hx    • Fainting Neg Hx    • Heart failure Neg Hx    • Hyperlipidemia Neg Hx    • Hypertension Neg Hx        Social History     Socioeconomic History   • Marital status:    Tobacco Use   • Smoking status: Former     Packs/day: 3.00     Years: 20.00     Pack years: 60.00     Types: Cigarettes     Quit date: 1988     Years since quittin.3   • Smokeless tobacco: Never   Vaping Use   • Vaping Use: Never used   Substance and Sexual Activity   • Alcohol use: Not Currently     Alcohol/week: 1.0 standard drink     Types: 1 Glasses of wine per week     Comment: 1-2 glass a month   • Drug use: Never     Comment: CBD oils   • Sexual activity: Not Currently     Partners: Male     Birth control/protection: Post-menopausal, Tubal ligation     Prior to Admission medications    Medication Sig Start Date End Date Taking? Authorizing Provider   albuterol sulfate  (90 Base) MCG/ACT inhaler Inhale 2 puffs Every 4 (Four) Hours As Needed for Wheezing.    Laila Shields MD   amLODIPine (NORVASC) 5 MG tablet Take 5 mg by mouth Daily. 21   Laila Shields MD   aspirin 81 MG EC tablet Take 81 mg by mouth Daily.    Laila Shields MD   escitalopram (LEXAPRO) 20 MG tablet Take 1 tablet by mouth Daily. 10/18/19   Laila Shields MD   losartan (COZAAR) 50 MG tablet Take 1 tablet by mouth 2 (Two) Times a Day. 20   Laila Shields MD   metoprolol succinate XL (TOPROL-XL) 100 MG 24 hr tablet TAKE 1 TABLET EVERY MORNINGFOR A TOTAL OF 150MG DAILY 22   Brett Zhou MD   metoprolol succinate XL (TOPROL-XL) 50 MG 24 hr tablet TAKE 1 TABLET EVERY EVENINGFOR A TOTAL OF 150MG DAILY 23   Brett Zhou MD   montelukast (SINGULAIR) 10 MG tablet  23   Laila Shields MD  "  Multiple Vitamins-Minerals (CENTRUM SILVER 50+WOMEN PO) Take 1 tablet by mouth Daily.    Provider, MD Laila   rosuvastatin (CRESTOR) 5 MG tablet Take 5 mg by mouth Daily. 9/16/21   Provider, Historical, MD   TRELEGY ELLIPTA 100-62.5-25 MCG/INH aerosol powder  Inhale 1 puff Daily. 11/19/19   ProviderLaila MD     /68 (BP Location: Right arm, Patient Position: Sitting)   Pulse 68   Temp 97.8 °F (36.6 °C) (Oral)   Resp 16   Ht 157.5 cm (62\")   Wt 74.4 kg (164 lb 0.4 oz)   SpO2 97%   BMI 30.00 kg/m²         Objective   Physical Exam  General: Well-developed well-appearing, no acute distress, alert and appropriate  Eyes:  sclera nonicteric  HEENT: Mucous membranes moist, no mucosal swelling  Neck: Supple, no nuchal rigidity,   Respirations: Respirations nonlabored, equal breath sounds bilaterally, clear lungs  Heart regular rate and rhythm, no murmurs rubs or gallops,   Abdomen soft, mildly tender palpation in the lower abdomen, no rebound or guarding, nondistended, no hepatosplenomegaly, no hernia, no mass, normal bowel sounds, no CVA tenderness  Extremities no clubbing cyanosis or edema, calves are symmetric and nontender  Neuro cranial nerves grossly intact, no focal limb deficits  Psych oriented, pleasant affect  Skin no rash, brisk cap refill  Procedures           ED Course      Results for orders placed or performed during the hospital encounter of 05/01/23   Comprehensive Metabolic Panel    Specimen: Blood   Result Value Ref Range    Glucose 99 65 - 99 mg/dL    BUN 16 8 - 23 mg/dL    Creatinine 0.74 0.57 - 1.00 mg/dL    Sodium 142 136 - 145 mmol/L    Potassium 4.0 3.5 - 5.2 mmol/L    Chloride 103 98 - 107 mmol/L    CO2 28.0 22.0 - 29.0 mmol/L    Calcium 9.8 8.6 - 10.5 mg/dL    Total Protein 7.5 6.0 - 8.5 g/dL    Albumin 4.7 3.5 - 5.2 g/dL    ALT (SGPT) 28 1 - 33 U/L    AST (SGOT) 28 1 - 32 U/L    Alkaline Phosphatase 92 39 - 117 U/L    Total Bilirubin 0.4 0.0 - 1.2 mg/dL    Globulin 2.8 " gm/dL    A/G Ratio 1.7 g/dL    BUN/Creatinine Ratio 21.6 7.0 - 25.0    Anion Gap 11.0 5.0 - 15.0 mmol/L    eGFR 88.8 >60.0 mL/min/1.73   Lipase    Specimen: Blood   Result Value Ref Range    Lipase 33 13 - 60 U/L   Urinalysis With Culture If Indicated - Urine, Clean Catch    Specimen: Urine, Clean Catch   Result Value Ref Range    Color, UA Yellow Yellow, Straw    Appearance, UA Clear Clear    pH, UA 7.5 5.0 - 8.0    Specific Gravity, UA <=1.005 1.005 - 1.030    Glucose, UA Negative Negative    Ketones, UA Negative Negative    Bilirubin, UA Negative Negative    Blood, UA Small (1+) (A) Negative    Protein, UA Negative Negative    Leuk Esterase, UA Trace (A) Negative    Nitrite, UA Negative Negative    Urobilinogen, UA 0.2 E.U./dL 0.2 - 1.0 E.U./dL   CBC Auto Differential    Specimen: Blood   Result Value Ref Range    WBC 6.80 3.40 - 10.80 10*3/mm3    RBC 4.58 3.77 - 5.28 10*6/mm3    Hemoglobin 14.3 12.0 - 15.9 g/dL    Hematocrit 41.5 34.0 - 46.6 %    MCV 90.5 79.0 - 97.0 fL    MCH 31.3 26.6 - 33.0 pg    MCHC 34.6 31.5 - 35.7 g/dL    RDW 13.6 12.3 - 15.4 %    RDW-SD 45.5 37.0 - 54.0 fl    MPV 9.0 6.0 - 12.0 fL    Platelets 237 140 - 450 10*3/mm3    Neutrophil % 58.2 42.7 - 76.0 %    Lymphocyte % 26.9 19.6 - 45.3 %    Monocyte % 11.5 5.0 - 12.0 %    Eosinophil % 2.3 0.3 - 6.2 %    Basophil % 1.1 0.0 - 1.5 %    Neutrophils, Absolute 4.00 1.70 - 7.00 10*3/mm3    Lymphocytes, Absolute 1.80 0.70 - 3.10 10*3/mm3    Monocytes, Absolute 0.80 0.10 - 0.90 10*3/mm3    Eosinophils, Absolute 0.20 0.00 - 0.40 10*3/mm3    Basophils, Absolute 0.10 0.00 - 0.20 10*3/mm3    nRBC 0.0 0.0 - 0.2 /100 WBC   Urinalysis, Microscopic Only - Urine, Clean Catch    Specimen: Urine, Clean Catch   Result Value Ref Range    RBC, UA 3-5 (A) None Seen /HPF    WBC, UA 0-2 (A) None Seen /HPF    Bacteria, UA None Seen None Seen /HPF    Squamous Epithelial Cells, UA 0-2 None Seen, 0-2 /HPF    Hyaline Casts, UA None Seen None Seen /LPF    Methodology  Automated Microscopy      CT Abdomen Pelvis With Contrast    Result Date: 5/1/2023  Small midline abdominal wall hernia containing fat. Mild hepatic steatosis with hepatomegaly. Nonspecific 9 mm low-density focus left lateral lobe of the liver. Unchanged since 2015. Therefore benign cyst or hemangioma. Status post hysterectomy. Electronically Signed: Kandi Hansen  5/1/2023 5:26 PM EDT  Workstation ID: [id number]                                         Medical Decision Making  Patient presents with abdominal pain differential diagnosis including bowel obstruction, ischemic bowel, peritonitis, obstructive uropathy, UTI    Notably she has a benign abdominal examination with no signs of peritonitis or acute abdomen.  Symptoms were improving throughout the emergency room course.  On reexamination she was advised of findings.  She was resting comfortably.  CT scan showed a fat-containing hernia.  This is a chronic finding for the patient.  There is no apparent incarcerated or strangulated hernia.  No sign of infection or obstruction.  She was aware of the cyst on the liver.  She is discharged in good condition and encouraged to follow-up with primary care this week for recheck.  She is given warning signs for return.    Abdominal pain, unspecified abdominal location: complicated acute illness or injury  Amount and/or Complexity of Data Reviewed  Labs: ordered. Decision-making details documented in ED Course.     Details: CBC, comprehensive metabolic panel, urinalysis all essentially negative  Radiology: ordered and independent interpretation performed.     Details: Depend interpretation of CT abdomen images no apparent obstruction or free air      Risk  Prescription drug management.          Final diagnoses:   Abdominal pain, unspecified abdominal location       ED Disposition  ED Disposition     ED Disposition   Discharge    Condition   Stable    Comment   --             Milagros Pal MD  [address] [facility] [facility]  [place] [address]  [phone]    Schedule an appointment as soon as possible for a visit in 2 days           Medication List      No changes were made to your prescriptions during this visit.          Delfino Tanner MD  05/01/23 [id number]

## 2023-05-01 NOTE — DISCHARGE INSTRUCTIONS
Rest, drink plenty of fluids, advance diet as tolerated.  Return for increased pain, vomiting, fever or any other concerns

## 2023-05-18 ENCOUNTER — TELEPHONE (OUTPATIENT)
Dept: CARDIOLOGY | Facility: CLINIC | Age: 68
End: 2023-05-18
Payer: MEDICARE

## 2023-05-18 NOTE — TELEPHONE ENCOUNTER
DR. TIMMONS  [facility]  SURGERY 6/6/23  [phone]    PLACED ON DR. MCGARRY DESK

## 2023-06-13 ENCOUNTER — DOCUMENTATION (OUTPATIENT)
Dept: PHYSICAL THERAPY | Facility: CLINIC | Age: 68
End: 2023-06-13

## 2023-06-13 DIAGNOSIS — M25.569 KNEE PAIN, UNSPECIFIED CHRONICITY, UNSPECIFIED LATERALITY: Primary | ICD-10-CM

## 2023-06-13 NOTE — PROGRESS NOTES
Pt arrived to her initial evaluation reporting she has no recent pain or limitation. Pt does not feel she needs therapy at this time. Pt advised to call MD for new referral if needed in the future.

## 2023-06-27 PROBLEM — K43.9 EPIGASTRIC HERNIA: Status: ACTIVE | Noted: 2023-06-27

## 2023-07-13 PROBLEM — K43.9 EPIGASTRIC HERNIA: Status: RESOLVED | Noted: 2023-06-27 | Resolved: 2023-07-13

## 2023-07-26 ENCOUNTER — OFFICE VISIT (OUTPATIENT)
Dept: SURGERY | Facility: CLINIC | Age: 68
End: 2023-07-26
Payer: MEDICARE

## 2023-07-26 VITALS
HEART RATE: 63 BPM | BODY MASS INDEX: 30.14 KG/M2 | DIASTOLIC BLOOD PRESSURE: 88 MMHG | SYSTOLIC BLOOD PRESSURE: 171 MMHG | HEIGHT: 62 IN | OXYGEN SATURATION: 98 % | TEMPERATURE: 97.8 F | WEIGHT: 163.8 LBS | RESPIRATION RATE: 18 BRPM

## 2023-07-26 DIAGNOSIS — K43.9 EPIGASTRIC HERNIA: Primary | ICD-10-CM

## 2023-07-26 PROCEDURE — 99213 OFFICE O/P EST LOW 20 MIN: CPT | Performed by: SURGERY

## 2023-07-26 PROCEDURE — 3079F DIAST BP 80-89 MM HG: CPT | Performed by: SURGERY

## 2023-07-26 PROCEDURE — 3077F SYST BP >= 140 MM HG: CPT | Performed by: SURGERY

## 2023-07-26 PROCEDURE — 1159F MED LIST DOCD IN RCRD: CPT | Performed by: SURGERY

## 2023-07-26 PROCEDURE — 1160F RVW MEDS BY RX/DR IN RCRD: CPT | Performed by: SURGERY

## 2023-07-28 NOTE — PROGRESS NOTES
"Post-op Note    Subjective   Aleah Coleman is a 68 y.o. female status post open epigastric hernia repair on 7/13/2023.  Overall, the patient is doing well.  She did have significant difficulties as her entire abdomen broke out into a fine red petechial rash following surgery.  It is unclear what exactly the nidus for this was, whether it was the chlorhexidine wash, the adhesive bandage, or the skin glue.  However, now she reports that she is doing much better and has no further rash.  Occasionally she will still have chills.      Objective   /88 (BP Location: Right arm, Patient Position: Sitting, Cuff Size: Adult)   Pulse 63   Temp 97.8 °F (36.6 °C) (Infrared)   Resp 18   Ht 157.5 cm (62\")   Wt 74.3 kg (163 lb 12.8 oz)   SpO2 98%   BMI 29.96 kg/m²   Surgical incision appears to be well-healed without any surrounding erythema, nursing, or drainage to suggest infection.  No evidence of recurrent epigastric hernia      Assessment & Plan   Patient is 68-year-old lady status post open epigastric hernia repair on 7/13/2023.  Overall doing well.    Regular diet as tolerated   Okay for cardiovascular activities  No heavy lifting for 6 weeks after surgery  Follow-up with me as needed    Joseph Kline MD  7/28/2023  12:35 EDT  "

## 2023-08-14 ENCOUNTER — ANESTHESIA EVENT (OUTPATIENT)
Dept: ANESTHESIOLOGY | Facility: HOSPITAL | Age: 68
End: 2023-08-14

## 2023-08-14 ENCOUNTER — ANESTHESIA (OUTPATIENT)
Dept: ANESTHESIOLOGY | Facility: HOSPITAL | Age: 68
End: 2023-08-14

## 2023-11-27 RX ORDER — METOPROLOL SUCCINATE 100 MG/1
100 TABLET, EXTENDED RELEASE ORAL DAILY
Qty: 90 TABLET | Refills: 3 | Status: SHIPPED | OUTPATIENT
Start: 2023-11-27

## 2023-11-27 NOTE — TELEPHONE ENCOUNTER
Rx Refill Note  Requested Prescriptions     Pending Prescriptions Disp Refills    metoprolol succinate XL (TOPROL-XL) 100 MG 24 hr tablet [Pharmacy Med Name: METOPROL SUC TAB 100MG ER] 90 tablet 3     Sig: TAKE 1 TABLET EVERY MORNINGFOR A TOTAL OF 150MG DAILY      Last office visit with prescribing clinician: 1/12/2023   Last telemedicine visit with prescribing clinician: Visit date not found   Next office visit with prescribing clinician: Visit date not found   {TIP  Encounters:23}    {TIP  Please add Last Relevant Lab Date if appropriate:23}              {TIP  Is Refill Pharmacy correct?:23}      Evon Gonzalez MA  11/27/23, 08:50 EST

## 2024-01-15 ENCOUNTER — TRANSCRIBE ORDERS (OUTPATIENT)
Dept: ADMINISTRATIVE | Facility: HOSPITAL | Age: 69
End: 2024-01-15
Payer: MEDICARE

## 2024-01-15 DIAGNOSIS — Z12.31 BREAST CANCER SCREENING BY MAMMOGRAM: Primary | ICD-10-CM

## 2024-01-30 ENCOUNTER — HOSPITAL ENCOUNTER (OUTPATIENT)
Dept: CARDIOLOGY | Facility: HOSPITAL | Age: 69
Discharge: HOME | End: 2024-01-30
Payer: MEDICARE

## 2024-01-30 ENCOUNTER — TRANSCRIBE ORDERS (OUTPATIENT)
Dept: ADMINISTRATIVE | Facility: HOSPITAL | Age: 69
End: 2024-01-30
Payer: MEDICARE

## 2024-01-30 ENCOUNTER — LAB (OUTPATIENT)
Dept: LAB | Facility: HOSPITAL | Age: 69
End: 2024-01-30
Payer: MEDICARE

## 2024-01-30 DIAGNOSIS — R73.09 IMPAIRED GLUCOSE TOLERANCE TEST: ICD-10-CM

## 2024-01-30 DIAGNOSIS — Z01.818 OTHER SPECIFIED PRE-OPERATIVE EXAMINATION: ICD-10-CM

## 2024-01-30 DIAGNOSIS — Z01.818 OTHER SPECIFIED PRE-OPERATIVE EXAMINATION: Primary | ICD-10-CM

## 2024-01-30 LAB
ALBUMIN SERPL-MCNC: 4.4 G/DL (ref 3.5–5.2)
ANION GAP SERPL CALCULATED.3IONS-SCNC: 10.1 MMOL/L (ref 5–15)
BASOPHILS # BLD AUTO: 0.09 10*3/MM3 (ref 0–0.2)
BASOPHILS NFR BLD AUTO: 1.5 % (ref 0–1.5)
BUN SERPL-MCNC: 13 MG/DL (ref 8–23)
BUN/CREAT SERPL: 17.8 (ref 7–25)
CALCIUM SPEC-SCNC: 9.5 MG/DL (ref 8.6–10.5)
CHLORIDE SERPL-SCNC: 102 MMOL/L (ref 98–107)
CO2 SERPL-SCNC: 28.9 MMOL/L (ref 22–29)
CREAT SERPL-MCNC: 0.73 MG/DL (ref 0.57–1)
DEPRECATED RDW RBC AUTO: 42.4 FL (ref 37–54)
EGFRCR SERPLBLD CKD-EPI 2021: 89.7 ML/MIN/1.73
EOSINOPHIL # BLD AUTO: 0.11 10*3/MM3 (ref 0–0.4)
EOSINOPHIL NFR BLD AUTO: 1.8 % (ref 0.3–6.2)
ERYTHROCYTE [DISTWIDTH] IN BLOOD BY AUTOMATED COUNT: 12.4 % (ref 12.3–15.4)
GLUCOSE SERPL-MCNC: 97 MG/DL (ref 65–99)
HBA1C MFR BLD: 5.8 % (ref 4.8–5.6)
HCT VFR BLD AUTO: 41.4 % (ref 34–46.6)
HGB BLD-MCNC: 14 G/DL (ref 12–15.9)
IMM GRANULOCYTES # BLD AUTO: 0.02 10*3/MM3 (ref 0–0.05)
IMM GRANULOCYTES NFR BLD AUTO: 0.3 % (ref 0–0.5)
LYMPHOCYTES # BLD AUTO: 1.71 10*3/MM3 (ref 0.7–3.1)
LYMPHOCYTES NFR BLD AUTO: 28.7 % (ref 19.6–45.3)
MCH RBC QN AUTO: 31.4 PG (ref 26.6–33)
MCHC RBC AUTO-ENTMCNC: 33.8 G/DL (ref 31.5–35.7)
MCV RBC AUTO: 92.8 FL (ref 79–97)
MONOCYTES # BLD AUTO: 0.73 10*3/MM3 (ref 0.1–0.9)
MONOCYTES NFR BLD AUTO: 12.2 % (ref 5–12)
MRSA DNA SPEC QL NAA+PROBE: NORMAL
NEUTROPHILS NFR BLD AUTO: 3.3 10*3/MM3 (ref 1.7–7)
NEUTROPHILS NFR BLD AUTO: 55.5 % (ref 42.7–76)
NRBC BLD AUTO-RTO: 0 /100 WBC (ref 0–0.2)
PLATELET # BLD AUTO: 251 10*3/MM3 (ref 140–450)
PMV BLD AUTO: 11.7 FL (ref 6–12)
POTASSIUM SERPL-SCNC: 4.1 MMOL/L (ref 3.5–5.2)
QT INTERVAL: 465 MS
QTC INTERVAL: 467 MS
RBC # BLD AUTO: 4.46 10*6/MM3 (ref 3.77–5.28)
SODIUM SERPL-SCNC: 141 MMOL/L (ref 136–145)
WBC NRBC COR # BLD AUTO: 5.96 10*3/MM3 (ref 3.4–10.8)

## 2024-01-30 PROCEDURE — 83036 HEMOGLOBIN GLYCOSYLATED A1C: CPT

## 2024-01-30 PROCEDURE — 85025 COMPLETE CBC W/AUTO DIFF WBC: CPT

## 2024-01-30 PROCEDURE — 82040 ASSAY OF SERUM ALBUMIN: CPT

## 2024-01-30 PROCEDURE — 80048 BASIC METABOLIC PNL TOTAL CA: CPT

## 2024-01-30 PROCEDURE — 36415 COLL VENOUS BLD VENIPUNCTURE: CPT

## 2024-01-30 PROCEDURE — 93005 ELECTROCARDIOGRAM TRACING: CPT | Performed by: ORTHOPAEDIC SURGERY

## 2024-01-30 PROCEDURE — 87641 MR-STAPH DNA AMP PROBE: CPT

## 2024-01-31 ENCOUNTER — TELEPHONE (OUTPATIENT)
Dept: CARDIOLOGY | Facility: CLINIC | Age: 69
End: 2024-01-31
Payer: MEDICARE

## 2024-01-31 NOTE — TELEPHONE ENCOUNTER
FACILITY: [facility]   DR: Akil Bailey  PHONE: [phone]  FAX: [phone]  PROCEDURE: Left unicompartmental knee arthoplasty  SCHEDULED: 02/20/24   MEDS TO HOLD: no anticoagulant

## 2024-02-05 ENCOUNTER — HOSPITAL ENCOUNTER (OUTPATIENT)
Dept: MAMMOGRAPHY | Facility: HOSPITAL | Age: 69
Discharge: HOME | End: 2024-02-05
Admitting: FAMILY MEDICINE
Payer: MEDICARE

## 2024-02-05 DIAGNOSIS — Z12.31 BREAST CANCER SCREENING BY MAMMOGRAM: ICD-10-CM

## 2024-02-05 PROCEDURE — 77067 SCR MAMMO BI INCL CAD: CPT

## 2024-02-05 PROCEDURE — 77063 BREAST TOMOSYNTHESIS BI: CPT

## 2024-02-08 LAB
QT INTERVAL: 465 MS
QTC INTERVAL: 467 MS

## 2024-04-22 NOTE — TELEPHONE ENCOUNTER
Rx Refill Note  Requested Prescriptions     Pending Prescriptions Disp Refills   • metoprolol succinate XL (TOPROL-XL) 50 MG 24 hr tablet [Pharmacy Med Name: METOPROL SUC TAB 50MG ER] 90 tablet 3     Sig: TAKE 1 TABLET EVERY EVENINGFOR A TOTAL OF 150MG DAILY      Last office visit with prescribing clinician: 1/12/2023   Last telemedicine visit with prescribing clinician: 7/20/2023   Next office visit with prescribing clinician: 7/20/2023                         Would you like a call back once the refill request has been completed: [] Yes [] No    If the office needs to give you a call back, can they leave a voicemail: [] Yes [] No    Mavis Christie MA  02/06/23, 08:06 EST  
Detail Level: Detailed

## 2025-01-21 ENCOUNTER — TRANSCRIBE ORDERS (OUTPATIENT)
Dept: ADMINISTRATIVE | Facility: HOSPITAL | Age: 70
End: 2025-01-21
Payer: MEDICARE

## 2025-01-21 DIAGNOSIS — Z13.820 SPECIAL SCREENING FOR OSTEOPOROSIS: Primary | ICD-10-CM

## 2025-01-21 DIAGNOSIS — Z12.31 ENCOUNTER FOR SCREENING MAMMOGRAM FOR MALIGNANT NEOPLASM OF BREAST: Primary | ICD-10-CM

## 2025-02-06 ENCOUNTER — HOSPITAL ENCOUNTER (OUTPATIENT)
Dept: BONE DENSITY | Facility: HOSPITAL | Age: 70
Discharge: HOME | End: 2025-02-06
Payer: MEDICARE

## 2025-02-06 ENCOUNTER — HOSPITAL ENCOUNTER (OUTPATIENT)
Dept: MAMMOGRAPHY | Facility: HOSPITAL | Age: 70
Discharge: HOME | End: 2025-02-06
Payer: MEDICARE

## 2025-02-06 DIAGNOSIS — Z12.31 ENCOUNTER FOR SCREENING MAMMOGRAM FOR MALIGNANT NEOPLASM OF BREAST: ICD-10-CM

## 2025-02-06 DIAGNOSIS — Z13.820 SPECIAL SCREENING FOR OSTEOPOROSIS: ICD-10-CM

## 2025-02-06 PROCEDURE — 77067 SCR MAMMO BI INCL CAD: CPT

## 2025-02-06 PROCEDURE — 77063 BREAST TOMOSYNTHESIS BI: CPT

## 2025-02-06 PROCEDURE — 77080 DXA BONE DENSITY AXIAL: CPT

## 2025-04-01 ENCOUNTER — HOSPITAL ENCOUNTER (INPATIENT)
Facility: HOSPITAL | Age: 70
LOS: 1 days | Discharge: HOME | End: 2025-04-03
Attending: EMERGENCY MEDICINE | Admitting: INTERNAL MEDICINE
Payer: MEDICARE

## 2025-04-01 ENCOUNTER — APPOINTMENT (OUTPATIENT)
Dept: GENERAL RADIOLOGY | Facility: HOSPITAL | Age: 70
End: 2025-04-01
Payer: MEDICARE

## 2025-04-01 DIAGNOSIS — I20.0 UNSTABLE ANGINA: ICD-10-CM

## 2025-04-01 DIAGNOSIS — R07.9 CHEST PAIN, UNSPECIFIED TYPE: Primary | ICD-10-CM

## 2025-04-01 DIAGNOSIS — I25.10 CHRONIC CORONARY ARTERY DISEASE: ICD-10-CM

## 2025-04-01 LAB
ANION GAP SERPL CALCULATED.3IONS-SCNC: 13.1 MMOL/L (ref 5–15)
APTT PPP: 25.7 SECONDS (ref 22.7–35.4)
BASOPHILS # BLD AUTO: 0.06 10*3/MM3 (ref 0–0.2)
BASOPHILS NFR BLD AUTO: 1.1 % (ref 0–1.5)
BUN SERPL-MCNC: 16 MG/DL (ref 8–23)
BUN/CREAT SERPL: 18.2 (ref 7–25)
CALCIUM SPEC-SCNC: 10.1 MG/DL (ref 8.6–10.5)
CHLORIDE SERPL-SCNC: 101 MMOL/L (ref 98–107)
CO2 SERPL-SCNC: 24.9 MMOL/L (ref 22–29)
CREAT SERPL-MCNC: 0.88 MG/DL (ref 0.57–1)
DEPRECATED RDW RBC AUTO: 42.3 FL (ref 37–54)
EGFRCR SERPLBLD CKD-EPI 2021: 71.2 ML/MIN/1.73
EOSINOPHIL # BLD AUTO: 0.13 10*3/MM3 (ref 0–0.4)
EOSINOPHIL NFR BLD AUTO: 2.3 % (ref 0.3–6.2)
ERYTHROCYTE [DISTWIDTH] IN BLOOD BY AUTOMATED COUNT: 12.8 % (ref 12.3–15.4)
GEN 5 1HR TROPONIN T REFLEX: 6 NG/L
GLUCOSE SERPL-MCNC: 133 MG/DL (ref 65–99)
HCT VFR BLD AUTO: 43.2 % (ref 34–46.6)
HGB BLD-MCNC: 14.4 G/DL (ref 12–15.9)
IMM GRANULOCYTES # BLD AUTO: 0.02 10*3/MM3 (ref 0–0.05)
IMM GRANULOCYTES NFR BLD AUTO: 0.4 % (ref 0–0.5)
INR PPP: 1.06 (ref 0.9–1.1)
LYMPHOCYTES # BLD AUTO: 1.23 10*3/MM3 (ref 0.7–3.1)
LYMPHOCYTES NFR BLD AUTO: 21.6 % (ref 19.6–45.3)
MCH RBC QN AUTO: 30 PG (ref 26.6–33)
MCHC RBC AUTO-ENTMCNC: 33.3 G/DL (ref 31.5–35.7)
MCV RBC AUTO: 90 FL (ref 79–97)
MONOCYTES # BLD AUTO: 0.54 10*3/MM3 (ref 0.1–0.9)
MONOCYTES NFR BLD AUTO: 9.5 % (ref 5–12)
NEUTROPHILS NFR BLD AUTO: 3.71 10*3/MM3 (ref 1.7–7)
NEUTROPHILS NFR BLD AUTO: 65.1 % (ref 42.7–76)
NRBC BLD AUTO-RTO: 0 /100 WBC (ref 0–0.2)
NT-PROBNP SERPL-MCNC: 159 PG/ML (ref 0–900)
PLATELET # BLD AUTO: 239 10*3/MM3 (ref 140–450)
PMV BLD AUTO: 10.2 FL (ref 6–12)
POTASSIUM SERPL-SCNC: 3.8 MMOL/L (ref 3.5–5.2)
PROTHROMBIN TIME: 13.7 SECONDS (ref 11.7–14.2)
RBC # BLD AUTO: 4.8 10*6/MM3 (ref 3.77–5.28)
SODIUM SERPL-SCNC: 139 MMOL/L (ref 136–145)
TROPONIN T NUMERIC DELTA: -1 NG/L
TROPONIN T SERPL HS-MCNC: 7 NG/L
WBC NRBC COR # BLD AUTO: 5.69 10*3/MM3 (ref 3.4–10.8)

## 2025-04-01 PROCEDURE — 99285 EMERGENCY DEPT VISIT HI MDM: CPT

## 2025-04-01 PROCEDURE — 84484 ASSAY OF TROPONIN QUANT: CPT | Performed by: EMERGENCY MEDICINE

## 2025-04-01 PROCEDURE — 85730 THROMBOPLASTIN TIME PARTIAL: CPT | Performed by: EMERGENCY MEDICINE

## 2025-04-01 PROCEDURE — 85025 COMPLETE CBC W/AUTO DIFF WBC: CPT | Performed by: EMERGENCY MEDICINE

## 2025-04-01 PROCEDURE — 71045 X-RAY EXAM CHEST 1 VIEW: CPT

## 2025-04-01 PROCEDURE — G0378 HOSPITAL OBSERVATION PER HR: HCPCS

## 2025-04-01 PROCEDURE — 36415 COLL VENOUS BLD VENIPUNCTURE: CPT

## 2025-04-01 PROCEDURE — 80048 BASIC METABOLIC PNL TOTAL CA: CPT | Performed by: EMERGENCY MEDICINE

## 2025-04-01 PROCEDURE — 93005 ELECTROCARDIOGRAM TRACING: CPT | Performed by: EMERGENCY MEDICINE

## 2025-04-01 PROCEDURE — 85610 PROTHROMBIN TIME: CPT | Performed by: EMERGENCY MEDICINE

## 2025-04-01 PROCEDURE — 83880 ASSAY OF NATRIURETIC PEPTIDE: CPT | Performed by: EMERGENCY MEDICINE

## 2025-04-01 RX ORDER — SODIUM CHLORIDE 9 MG/ML
40 INJECTION, SOLUTION INTRAVENOUS AS NEEDED
Status: DISCONTINUED | OUTPATIENT
Start: 2025-04-01 | End: 2025-04-03 | Stop reason: HOSPADM

## 2025-04-01 RX ORDER — MONTELUKAST SODIUM 10 MG/1
10 TABLET ORAL NIGHTLY
Status: DISCONTINUED | OUTPATIENT
Start: 2025-04-01 | End: 2025-04-03 | Stop reason: HOSPADM

## 2025-04-01 RX ORDER — ESCITALOPRAM OXALATE 10 MG/1
20 TABLET ORAL DAILY
Status: DISCONTINUED | OUTPATIENT
Start: 2025-04-02 | End: 2025-04-03 | Stop reason: HOSPADM

## 2025-04-01 RX ORDER — AMLODIPINE BESYLATE 5 MG/1
5 TABLET ORAL DAILY
Status: DISCONTINUED | OUTPATIENT
Start: 2025-04-02 | End: 2025-04-03 | Stop reason: HOSPADM

## 2025-04-01 RX ORDER — ACETAMINOPHEN 650 MG/1
650 SUPPOSITORY RECTAL EVERY 4 HOURS PRN
Status: DISCONTINUED | OUTPATIENT
Start: 2025-04-01 | End: 2025-04-03 | Stop reason: HOSPADM

## 2025-04-01 RX ORDER — BISACODYL 10 MG
10 SUPPOSITORY, RECTAL RECTAL DAILY PRN
Status: DISCONTINUED | OUTPATIENT
Start: 2025-04-01 | End: 2025-04-03 | Stop reason: HOSPADM

## 2025-04-01 RX ORDER — ONDANSETRON 2 MG/ML
4 INJECTION INTRAMUSCULAR; INTRAVENOUS EVERY 6 HOURS PRN
Status: DISCONTINUED | OUTPATIENT
Start: 2025-04-01 | End: 2025-04-03 | Stop reason: HOSPADM

## 2025-04-01 RX ORDER — ASPIRIN 81 MG/1
81 TABLET ORAL DAILY
Status: DISCONTINUED | OUTPATIENT
Start: 2025-04-02 | End: 2025-04-03 | Stop reason: HOSPADM

## 2025-04-01 RX ORDER — NITROGLYCERIN 0.4 MG/1
0.4 TABLET SUBLINGUAL
Status: DISCONTINUED | OUTPATIENT
Start: 2025-04-01 | End: 2025-04-03 | Stop reason: HOSPADM

## 2025-04-01 RX ORDER — ONDANSETRON 4 MG/1
4 TABLET, ORALLY DISINTEGRATING ORAL EVERY 6 HOURS PRN
Status: DISCONTINUED | OUTPATIENT
Start: 2025-04-01 | End: 2025-04-03 | Stop reason: HOSPADM

## 2025-04-01 RX ORDER — SODIUM CHLORIDE 0.9 % (FLUSH) 0.9 %
10 SYRINGE (ML) INJECTION AS NEEDED
Status: DISCONTINUED | OUTPATIENT
Start: 2025-04-01 | End: 2025-04-03 | Stop reason: HOSPADM

## 2025-04-01 RX ORDER — ROSUVASTATIN CALCIUM 5 MG/1
5 TABLET, COATED ORAL NIGHTLY
Status: DISCONTINUED | OUTPATIENT
Start: 2025-04-01 | End: 2025-04-03 | Stop reason: HOSPADM

## 2025-04-01 RX ORDER — BISACODYL 5 MG/1
5 TABLET, DELAYED RELEASE ORAL DAILY PRN
Status: DISCONTINUED | OUTPATIENT
Start: 2025-04-01 | End: 2025-04-03 | Stop reason: HOSPADM

## 2025-04-01 RX ORDER — HYDRALAZINE HYDROCHLORIDE 20 MG/ML
10 INJECTION INTRAMUSCULAR; INTRAVENOUS EVERY 6 HOURS PRN
Status: DISCONTINUED | OUTPATIENT
Start: 2025-04-01 | End: 2025-04-03 | Stop reason: HOSPADM

## 2025-04-01 RX ORDER — POLYETHYLENE GLYCOL 3350 17 G/17G
17 POWDER, FOR SOLUTION ORAL DAILY PRN
Status: DISCONTINUED | OUTPATIENT
Start: 2025-04-01 | End: 2025-04-03 | Stop reason: HOSPADM

## 2025-04-01 RX ORDER — AMOXICILLIN 250 MG
2 CAPSULE ORAL 2 TIMES DAILY PRN
Status: DISCONTINUED | OUTPATIENT
Start: 2025-04-01 | End: 2025-04-03 | Stop reason: HOSPADM

## 2025-04-01 RX ORDER — LOSARTAN POTASSIUM 50 MG/1
50 TABLET ORAL 2 TIMES DAILY
Status: DISCONTINUED | OUTPATIENT
Start: 2025-04-01 | End: 2025-04-03 | Stop reason: HOSPADM

## 2025-04-01 RX ORDER — ACETAMINOPHEN 160 MG/5ML
650 SOLUTION ORAL EVERY 4 HOURS PRN
Status: DISCONTINUED | OUTPATIENT
Start: 2025-04-01 | End: 2025-04-03 | Stop reason: HOSPADM

## 2025-04-01 RX ORDER — SODIUM CHLORIDE 0.9 % (FLUSH) 0.9 %
10 SYRINGE (ML) INJECTION EVERY 12 HOURS SCHEDULED
Status: DISCONTINUED | OUTPATIENT
Start: 2025-04-01 | End: 2025-04-03 | Stop reason: HOSPADM

## 2025-04-01 RX ORDER — ACETAMINOPHEN 325 MG/1
650 TABLET ORAL EVERY 4 HOURS PRN
Status: DISCONTINUED | OUTPATIENT
Start: 2025-04-01 | End: 2025-04-03 | Stop reason: HOSPADM

## 2025-04-01 RX ADMIN — LOSARTAN POTASSIUM 50 MG: 50 TABLET, FILM COATED ORAL at 20:15

## 2025-04-01 RX ADMIN — Medication 10 ML: at 20:16

## 2025-04-01 RX ADMIN — ROSUVASTATIN CALCIUM 5 MG: 5 TABLET, FILM COATED ORAL at 20:15

## 2025-04-01 RX ADMIN — MONTELUKAST 10 MG: 10 TABLET, FILM COATED ORAL at 20:15

## 2025-04-01 NOTE — H&P
Patient Care Team:  Milagros Pal MD as PCP - General (Family Medicine)  Brett Zhou MD as Consulting Physician (Cardiology)    Chief complaint chest discomfort    Subjective     Patient is a 70 y.o. female with previously diagnosed nonobstructive coronary artery disease who presents from home with 2 episodes of substernal chest pain.  She woke in the middle of the night with substernal chest pain that radiated through to her back.  She did not experience any shortness of breath or nausea at that time.  It lasted for about 4 hours and then spontaneously resolved.  Earlier today she was running errands and walking quickly when she noticed that substernal chest pressure returned.  At this point she was short of breath.  She presented to the emergency room for further evaluation.  Symptoms have subsequently resolved.  Previous cardiac workup includes a heart cath in January 2020 that showed 50% disease in the LAD, 30 to 40% disease in the proximal RCA and 50% lesion in the circumflex.    She has had a previous cholecystectomy and denies any NSAID use.    Review of Systems   Constitutional:  Positive for activity change. Negative for appetite change, chills, diaphoresis, fatigue and fever.   HENT:  Negative for congestion and facial swelling.    Eyes:  Negative for visual disturbance.   Respiratory:  Positive for shortness of breath. Negative for cough, wheezing and stridor.    Cardiovascular:  Positive for chest pain. Negative for palpitations and leg swelling.   Gastrointestinal:  Negative for abdominal pain, constipation, diarrhea, nausea and vomiting.   Endocrine: Negative for polyuria.   Genitourinary:  Negative for dysuria.   Musculoskeletal:  Negative for arthralgias and back pain.   Allergic/Immunologic: Negative for immunocompromised state.   Neurological:  Negative for weakness, light-headedness and numbness.   Psychiatric/Behavioral:  Negative for confusion.           History  Past Medical  History:   Diagnosis Date    Abnormal ECG Don’t  know    Asthma     Cholelithiasis ?    Not sure of year    Chronic coronary artery disease 4/1/2025    COPD (chronic obstructive pulmonary disease)     Hyperlipidemia     Hypertension     Liver disease fatty    PONV (postoperative nausea and vomiting)     Sleep apnea     on a cpap since 5/2019     Past Surgical History:   Procedure Laterality Date    APPENDECTOMY      BACK SURGERY      Disc    CARDIAC CATHETERIZATION N/A 01/24/2020    Procedure: Left Heart Cath with Coronary Angiography;  Surgeon: Brett Zhou MD;  Location: [facility] CATH INVASIVE LOCATION;  Service: Cardiovascular    CARDIAC CATHETERIZATION N/A 01/24/2020    Procedure: Left ventriculography;  Surgeon: Brett Zhou MD;  Location: [facility] CATH INVASIVE LOCATION;  Service: Cardiovascular    CHOLECYSTECTOMY      EPIGASTRIC HERNIA REPAIR N/A 7/13/2023    Procedure: EPIGASTRIC HERNIA REPAIR;  Surgeon: Joseph Kline MD;  Location: [facility] MAIN OR;  Service: General;  Laterality: N/A;    HEMORRHOIDECTOMY  1970    HYSTERECTOMY       Family History   Problem Relation Age of Onset    Stroke Mother     Alcohol abuse Mother     Stomach cancer Father     Heart attack Sister     Heart disease Sister     Arthritis Sister     Miscarriages / Stillbirths Sister     Drug abuse Sister     Heart disease Brother     Heart disease Brother     Hypertension Brother     Kidney disease Brother     Heart disease Brother     Hearing loss Brother     Heart disease Maternal Grandmother     Heart disease Maternal Grandfather     No Known Problems Paternal Grandmother     No Known Problems Paternal Grandfather     Ovarian cancer Maternal Aunt     No Known Problems Maternal Uncle     No Known Problems Paternal Aunt     No Known Problems Paternal Uncle     No Known Problems Other     Anemia Neg Hx     Arrhythmia Neg Hx     Asthma Neg Hx     Clotting disorder Neg Hx     Fainting Neg Hx     Heart failure Neg Hx      Hyperlipidemia Neg Hx      Social History     Tobacco Use    Smoking status: Former     Current packs/day: 0.00     Average packs/day: 3.0 packs/day for 20.0 years (60.0 ttl pk-yrs)     Types: Cigarettes     Start date: 1968     Quit date: 1988     Years since quittin.2     Passive exposure: Past    Smokeless tobacco: Never   Vaping Use    Vaping status: Never Used   Substance Use Topics    Alcohol use: Not Currently     Comment: 1-2 glass a month    Drug use: Never     Comment: CBD oils     (Not in a hospital admission)    Allergies:  Penicillin g and Vancomycin    Objective     Vital Signs  Temp:  [98 °F (36.7 °C)-98.3 °F (36.8 °C)] 98.3 °F (36.8 °C)  Heart Rate:  [60-76] 61  Resp:  [18-20] 18  BP: (137-186)/(71-99) 141/71     Physical Exam:      General Appearance:    Alert, cooperative, in no acute distress   Head:    Normocephalic, without obvious abnormality, atraumatic   Eyes:            Lids and lashes normal, conjunctivae and sclerae normal, no   icterus, no pallor, corneas clear, PERRLA   Ears:    Ears appear intact with no abnormalities noted   Throat:   No oral lesions, no thrush, oral mucosa moist   Neck:   No adenopathy, supple, trachea midline, no thyromegaly, no   carotid bruit, no JVD   Lungs:     Clear to auscultation,respirations regular, even and                  unlabored    Heart:    Regular rhythm and normal rate, normal S1 and S2, no            murmur, no gallop, no rub, no click   Chest Wall:    No abnormalities observed   Abdomen:     Normal bowel sounds, no masses, no organomegaly, soft        non-tender, non-distended, no guarding, no rebound                tenderness   Extremities:   Moves all extremities well, no edema, no cyanosis, no             redness   Pulses:   Pulses palpable and equal bilaterally   Skin:   No bleeding, bruising or rash   Lymph nodes:   No palpable adenopathy   Neurologic:   Cranial nerves 2 - 12 grossly intact, sensation intact, DTR       present  and equal bilaterally       Results Review:     Imaging Results (Last 24 Hours)       Procedure Component Value Units Date/Time    XR Chest 1 View [[id number]] Collected: 04/01/25 1353     Updated: 04/01/25 1356    Narrative:      XR CHEST 1 VW    Date of Exam: 4/1/2025 1:42 PM EDT    Indication: chest pain    Comparison: 7/13/2023    Findings:  Cardiomediastinal silhouette is unremarkable. Minimal basal atelectasis. Trace left effusion. No pneumothorax. No acute osseous abnormality identified.      Impression:      Impression:  Minimal basal atelectasis and trace left effusion.        Electronically Signed: Geovanni Ortega MD    4/1/2025 1:53 PM EDT    Workstation ID: [id number]             Lab Results (last 24 hours)       Procedure Component Value Units Date/Time    High Sensitivity Troponin T 1Hr [[id number]]  (Normal) Collected: 04/01/25 1444    Specimen: Blood Updated: 04/01/25 1510     HS Troponin T 6 ng/L      Troponin T Numeric Delta -1 ng/L     Narrative:      High Sensitive Troponin T Reference Range:  <14.0 ng/L- Negative Female for AMI  <22.0 ng/L- Negative Male for AMI  >=14 - Abnormal Female indicating possible myocardial injury.  >=22 - Abnormal Male indicating possible myocardial injury.   Clinicians would have to utilize clinical acumen, EKG, Troponin, and serial changes to determine if it is an Acute Myocardial Infarction or myocardial injury due to an underlying chronic condition.         Basic Metabolic Panel [[id number]]  (Abnormal) Collected: 04/01/25 1340    Specimen: Blood Updated: 04/01/25 1406     Glucose 133 mg/dL      BUN 16 mg/dL      Creatinine 0.88 mg/dL      Sodium 139 mmol/L      Potassium 3.8 mmol/L      Chloride 101 mmol/L      CO2 24.9 mmol/L      Calcium 10.1 mg/dL      BUN/Creatinine Ratio 18.2     Anion Gap 13.1 mmol/L      eGFR 71.2 mL/min/1.73     Narrative:      GFR Categories in Chronic Kidney Disease (CKD)      GFR Category          GFR (mL/min/1.73)    Interpretation  G1                      90 or greater         Normal or high (1)  G2                      60-89                Mild decrease (1)  G3a                   45-59                Mild to moderate decrease  G3b                   30-44                Moderate to severe decrease  G4                    15-29                Severe decrease  G5                    14 or less           Kidney failure          (1)In the absence of evidence of kidney disease, neither GFR category G1 or G2 fulfill the criteria for CKD.    eGFR calculation 2021 CKD-EPI creatinine equation, which does not include race as a factor    High Sensitivity Troponin T [[id number]]  (Normal) Collected: 04/01/25 1340    Specimen: Blood Updated: 04/01/25 1406     HS Troponin T 7 ng/L     Narrative:      High Sensitive Troponin T Reference Range:  <14.0 ng/L- Negative Female for AMI  <22.0 ng/L- Negative Male for AMI  >=14 - Abnormal Female indicating possible myocardial injury.  >=22 - Abnormal Male indicating possible myocardial injury.   Clinicians would have to utilize clinical acumen, EKG, Troponin, and serial changes to determine if it is an Acute Myocardial Infarction or myocardial injury due to an underlying chronic condition.         BNP [[id number]]  (Normal) Collected: 04/01/25 1340    Specimen: Blood Updated: 04/01/25 1406     proBNP 159.0 pg/mL     Narrative:      This assay is used as an aid in the diagnosis of individuals suspected of having heart failure. It can be used as an aid in the diagnosis of acute decompensated heart failure (ADHF) in patients presenting with signs and symptoms of ADHF to the emergency department (ED). In addition, NT-proBNP of <300 pg/mL indicates ADHF is not likely.    Age Range Result Interpretation  NT-proBNP Concentration (pg/mL:      <50             Positive            >450                   Gray                 300-450                    Negative             <300    50-75           Positive            >900                  Abraham                 300-900                  Negative            <300      >75             Positive            >1800                  Gray                300-1800                  Negative            <300    aPTT [[id number]]  (Normal) Collected: 04/01/25 1340    Specimen: Blood Updated: 04/01/25 1352     PTT 25.7 seconds     Protime-INR [[id number]]  (Normal) Collected: 04/01/25 1340    Specimen: Blood Updated: 04/01/25 1352     Protime 13.7 Seconds      INR 1.06    CBC & Differential [[id number]]  (Normal) Collected: 04/01/25 1340    Specimen: Blood Updated: 04/01/25 1344    Narrative:      The following orders were created for panel order CBC & Differential.  Procedure                               Abnormality         Status                     ---------                               -----------         ------                     CBC Auto Differential[[id number]]        Normal              Final result                 Please view results for these tests on the individual orders.    CBC Auto Differential [[id number]]  (Normal) Collected: 04/01/25 1340    Specimen: Blood Updated: 04/01/25 1344     WBC 5.69 10*3/mm3      RBC 4.80 10*6/mm3      Hemoglobin 14.4 g/dL      Hematocrit 43.2 %      MCV 90.0 fL      MCH 30.0 pg      MCHC 33.3 g/dL      RDW 12.8 %      RDW-SD 42.3 fl      MPV 10.2 fL      Platelets 239 10*3/mm3      Neutrophil % 65.1 %      Lymphocyte % 21.6 %      Monocyte % 9.5 %      Eosinophil % 2.3 %      Basophil % 1.1 %      Immature Grans % 0.4 %      Neutrophils, Absolute 3.71 10*3/mm3      Lymphocytes, Absolute 1.23 10*3/mm3      Monocytes, Absolute 0.54 10*3/mm3      Eosinophils, Absolute 0.13 10*3/mm3      Basophils, Absolute 0.06 10*3/mm3      Immature Grans, Absolute 0.02 10*3/mm3      nRBC 0.0 /100 WBC              I reviewed the patient's new clinical results.    Assessment & Plan       Chest pain    Asthma    Hypertension    LBBB (left bundle branch block)    Chest pressure    Chronic coronary artery  disease    Patient has ruled out for acute coronary syndrome but her symptoms are concerning and somebody who is known to have nonobstructive but multivessel disease.  Stress test has been scheduled for the morning.  Her cardiologist has been consulted.  Will maintain on telemetry overnight.  Left bundle branch block was previously noted.    Home medications for her other chronic medical problems) hypertension, hyperlipidemia, mood disorder, and asthma) will be continued.  SCDs for DVT prophylaxis    CODE STATUS:  Code status (Patient has no pulse and is not breathing):  CPR (Attempt to Resuscitate)  Medical Interventions (Patient has pulse or is breathing):  Full Support  Level of Support Discussed with:  Patient    Admission Status:  I believe this patient meets observation status    Expected length of stay:  1 midnights     I discussed the patient's findings and my recommendations with patient.     Gabriela Ewing MD  04/01/25  18:02 EDT

## 2025-04-01 NOTE — Clinical Note
Level of Care: Telemetry [5]   Admitting Physician: BRANDON CHAVEZ [[id number]]   Attending Physician: BRANDON CHAVEZ [[id number]]

## 2025-04-01 NOTE — ED PROVIDER NOTES
Subjective   History of Present Illness  Chief complaint: Chest pain    70-year-old female presents with chest pain.  Patient states pain started yesterday and has been intermittent.  She describes it as a pressure sensation.  She has had associated shortness of breath and nausea.  She denies any diaphoresis.  No known alleviating or exacerbating factors.  She denies any pain currently.    History provided by:  Patient      Review of Systems   Constitutional:  Negative for fever.   HENT:  Negative for congestion.    Respiratory:  Positive for shortness of breath. Negative for cough.    Cardiovascular:  Positive for chest pain.   Gastrointestinal:  Positive for nausea. Negative for abdominal pain and vomiting.   Musculoskeletal:  Negative for back pain.   Neurological:  Negative for headaches.   Psychiatric/Behavioral:  Negative for confusion.        Past Medical History:   Diagnosis Date    Abnormal ECG Don’t  know    Asthma     Cholelithiasis ?    Not sure of year    COPD (chronic obstructive pulmonary disease)     Hyperlipidemia     Hypertension     Liver disease fatty    PONV (postoperative nausea and vomiting)     Sleep apnea     on a cpap since 5/2019       Allergies   Allergen Reactions    Penicillin G Other (See Comments)    Vancomycin Swelling       Past Surgical History:   Procedure Laterality Date    APPENDECTOMY      BACK SURGERY      Disc    CARDIAC CATHETERIZATION N/A 01/24/2020    Procedure: Left Heart Cath with Coronary Angiography;  Surgeon: Brett Zhou MD;  Location: [facility] CATH INVASIVE LOCATION;  Service: Cardiovascular    CARDIAC CATHETERIZATION N/A 01/24/2020    Procedure: Left ventriculography;  Surgeon: Brett Zhou MD;  Location: [facility] CATH INVASIVE LOCATION;  Service: Cardiovascular    CHOLECYSTECTOMY      EPIGASTRIC HERNIA REPAIR N/A 7/13/2023    Procedure: EPIGASTRIC HERNIA REPAIR;  Surgeon: Joseph Kline MD;  Location: [facility] MAIN OR;  Service: General;  Laterality:  "N/A;    HEMORRHOIDECTOMY  1970    HYSTERECTOMY         Family History   Problem Relation Age of Onset    Stroke Mother     Alcohol abuse Mother     Stomach cancer Father     Heart attack Sister     Heart disease Sister     Arthritis Sister     Miscarriages / Stillbirths Sister     Drug abuse Sister     Heart disease Brother     Heart disease Brother     Hypertension Brother     Kidney disease Brother     Heart disease Brother     Hearing loss Brother     Heart disease Maternal Grandmother     Heart disease Maternal Grandfather     No Known Problems Paternal Grandmother     No Known Problems Paternal Grandfather     Ovarian cancer Maternal Aunt     No Known Problems Maternal Uncle     No Known Problems Paternal Aunt     No Known Problems Paternal Uncle     No Known Problems Other     Anemia Neg Hx     Arrhythmia Neg Hx     Asthma Neg Hx     Clotting disorder Neg Hx     Fainting Neg Hx     Heart failure Neg Hx     Hyperlipidemia Neg Hx        Social History     Socioeconomic History    Marital status:    Tobacco Use    Smoking status: Former     Current packs/day: 0.00     Average packs/day: 3.0 packs/day for 20.0 years (60.0 ttl pk-yrs)     Types: Cigarettes     Start date: 1968     Quit date: 1988     Years since quittin.2     Passive exposure: Past    Smokeless tobacco: Never   Vaping Use    Vaping status: Never Used   Substance and Sexual Activity    Alcohol use: Not Currently     Comment: 1-2 glass a month    Drug use: Never     Comment: CBD oils    Sexual activity: Not Currently     Partners: Male     Birth control/protection: Abstinence, Post-menopausal, Tubal ligation, Hysterectomy       /88   Pulse 68   Temp 98.3 °F (36.8 °C) (Oral)   Resp 18   Ht 157.5 cm (62\")   Wt 78.8 kg (173 lb 11.6 oz)   SpO2 97%   BMI 31.77 kg/m²       Objective   Physical Exam  Vitals and nursing note reviewed.   Constitutional:       Appearance: She is well-developed.   HENT:      Head: Normocephalic " and atraumatic.   Cardiovascular:      Rate and Rhythm: Normal rate and regular rhythm.      Heart sounds: Normal heart sounds.   Pulmonary:      Effort: Pulmonary effort is normal. No respiratory distress.      Breath sounds: Normal breath sounds.   Abdominal:      Palpations: Abdomen is soft.      Tenderness: There is no abdominal tenderness.   Musculoskeletal:      Right lower leg: No tenderness. No edema.      Left lower leg: No tenderness. No edema.   Skin:     General: Skin is warm and dry.   Neurological:      Mental Status: She is alert and oriented to person, place, and time.         Procedures           ED Course      My interpretation of EKG shows sinus rhythm, rate of 70, left bundle branch block, unchanged from previous            HEART Score: 6                          Results for orders placed or performed during the hospital encounter of 04/01/25   ECG 12 Lead Chest Pain    Collection Time: 04/01/25  1:31 PM   Result Value Ref Range    QT Interval 445 ms    QTC Interval 481 ms   Basic Metabolic Panel    Collection Time: 04/01/25  1:40 PM    Specimen: Blood   Result Value Ref Range    Glucose 133 (H) 65 - 99 mg/dL    BUN 16 8 - 23 mg/dL    Creatinine 0.88 0.57 - 1.00 mg/dL    Sodium 139 136 - 145 mmol/L    Potassium 3.8 3.5 - 5.2 mmol/L    Chloride 101 98 - 107 mmol/L    CO2 24.9 22.0 - 29.0 mmol/L    Calcium 10.1 8.6 - 10.5 mg/dL    BUN/Creatinine Ratio 18.2 7.0 - 25.0    Anion Gap 13.1 5.0 - 15.0 mmol/L    eGFR 71.2 >60.0 mL/min/1.73   aPTT    Collection Time: 04/01/25  1:40 PM    Specimen: Blood   Result Value Ref Range    PTT 25.7 22.7 - 35.4 seconds   Protime-INR    Collection Time: 04/01/25  1:40 PM    Specimen: Blood   Result Value Ref Range    Protime 13.7 11.7 - 14.2 Seconds    INR 1.06 0.90 - 1.10   High Sensitivity Troponin T    Collection Time: 04/01/25  1:40 PM    Specimen: Blood   Result Value Ref Range    HS Troponin T 7 <14 ng/L   BNP    Collection Time: 04/01/25  1:40 PM    Specimen:  Blood   Result Value Ref Range    proBNP 159.0 0.0 - 900.0 pg/mL   CBC Auto Differential    Collection Time: 04/01/25  1:40 PM    Specimen: Blood   Result Value Ref Range    WBC 5.69 3.40 - 10.80 10*3/mm3    RBC 4.80 3.77 - 5.28 10*6/mm3    Hemoglobin 14.4 12.0 - 15.9 g/dL    Hematocrit 43.2 34.0 - 46.6 %    MCV 90.0 79.0 - 97.0 fL    MCH 30.0 26.6 - 33.0 pg    MCHC 33.3 31.5 - 35.7 g/dL    RDW 12.8 12.3 - 15.4 %    RDW-SD 42.3 37.0 - 54.0 fl    MPV 10.2 6.0 - 12.0 fL    Platelets 239 140 - 450 10*3/mm3    Neutrophil % 65.1 42.7 - 76.0 %    Lymphocyte % 21.6 19.6 - 45.3 %    Monocyte % 9.5 5.0 - 12.0 %    Eosinophil % 2.3 0.3 - 6.2 %    Basophil % 1.1 0.0 - 1.5 %    Immature Grans % 0.4 0.0 - 0.5 %    Neutrophils, Absolute 3.71 1.70 - 7.00 10*3/mm3    Lymphocytes, Absolute 1.23 0.70 - 3.10 10*3/mm3    Monocytes, Absolute 0.54 0.10 - 0.90 10*3/mm3    Eosinophils, Absolute 0.13 0.00 - 0.40 10*3/mm3    Basophils, Absolute 0.06 0.00 - 0.20 10*3/mm3    Immature Grans, Absolute 0.02 0.00 - 0.05 10*3/mm3    nRBC 0.0 0.0 - 0.2 /100 WBC     XR Chest 1 View  Result Date: 4/1/2025  Impression: Minimal basal atelectasis and trace left effusion. Electronically Signed: Geovanni Ortega MD  4/1/2025 1:53 PM EDT  Workstation ID: [id number]                  Medical Decision Making  Amount and/or Complexity of Data Reviewed  Labs: ordered.  Radiology: ordered.  ECG/medicine tests: ordered.    Risk  Prescription drug management.      Patient had the above evaluation.  Results were discussed with the patient.  My interpretation of chest x-ray shows no acute infiltrate or edema.  White blood cell count is normal.  BMP is unremarkable.  Troponin is negative.  BNP is normal.  EKG shows no obvious acute ischemia.  Patient does have a heart score of 6.  I discussed with the provider on-call for the primary doctor and the patient will be admitted for further evaluation and management.      Final diagnoses:   Chest pain, unspecified type       ED  Disposition  ED Disposition       ED Disposition   Decision to Admit    Condition   --    Comment   Level of Care: Telemetry [5]   Admitting Physician: BRANDON CHAVEZ [[id number]]   Attending Physician: BRANDON CHAVEZ [[id number]]                 No follow-up provider specified.       Medication List      No changes were made to your prescriptions during this visit.            Nahun Tanner MD  04/01/25 [id number]

## 2025-04-02 ENCOUNTER — APPOINTMENT (OUTPATIENT)
Dept: NUCLEAR MEDICINE | Facility: HOSPITAL | Age: 70
End: 2025-04-02
Payer: MEDICARE

## 2025-04-02 ENCOUNTER — APPOINTMENT (OUTPATIENT)
Dept: CARDIOLOGY | Facility: HOSPITAL | Age: 70
End: 2025-04-02
Payer: MEDICARE

## 2025-04-02 PROBLEM — I20.0 UNSTABLE ANGINA: Status: ACTIVE | Noted: 2025-04-01

## 2025-04-02 LAB
AORTIC DIMENSIONLESS INDEX: 0.55 (DI)
AV MEAN PRESS GRAD SYS DOP V1V2: 4.5 MMHG
AV VMAX SYS DOP: 139.8 CM/SEC
BH CV ECHO MEAS - ACS: 1.83 CM
BH CV ECHO MEAS - AO MAX PG: 7.8 MMHG
BH CV ECHO MEAS - AO V2 VTI: 32.6 CM
BH CV ECHO MEAS - AVA(I,D): 1.24 CM2
BH CV ECHO MEAS - EDV(CUBED): 67 ML
BH CV ECHO MEAS - EDV(MOD-SP4): 61.7 ML
BH CV ECHO MEAS - EF(MOD-SP4): 66.5 %
BH CV ECHO MEAS - ESV(CUBED): 13 ML
BH CV ECHO MEAS - ESV(MOD-SP4): 20.7 ML
BH CV ECHO MEAS - FS: 42.1 %
BH CV ECHO MEAS - IVS/LVPW: 0.88 CM
BH CV ECHO MEAS - IVSD: 0.99 CM
BH CV ECHO MEAS - LA DIMENSION: 3.8 CM
BH CV ECHO MEAS - LAT PEAK E' VEL: 9 CM/SEC
BH CV ECHO MEAS - LV MASS(C)D: 141.4 GRAMS
BH CV ECHO MEAS - LV MAX PG: 2.6 MMHG
BH CV ECHO MEAS - LV MEAN PG: 1.6 MMHG
BH CV ECHO MEAS - LV V1 MAX: 81.2 CM/SEC
BH CV ECHO MEAS - LV V1 VTI: 17.9 CM
BH CV ECHO MEAS - LVIDD: 4.1 CM
BH CV ECHO MEAS - LVIDS: 2.35 CM
BH CV ECHO MEAS - LVOT AREA: 2.26 CM2
BH CV ECHO MEAS - LVOT DIAM: 1.7 CM
BH CV ECHO MEAS - LVPWD: 1.13 CM
BH CV ECHO MEAS - MED PEAK E' VEL: 7.2 CM/SEC
BH CV ECHO MEAS - MV A MAX VEL: 82.8 CM/SEC
BH CV ECHO MEAS - MV DEC SLOPE: 284.7 CM/SEC2
BH CV ECHO MEAS - MV DEC TIME: 0.22 SEC
BH CV ECHO MEAS - MV E MAX VEL: 63.4 CM/SEC
BH CV ECHO MEAS - MV E/A: 0.77
BH CV ECHO MEAS - MV MAX PG: 2.7 MMHG
BH CV ECHO MEAS - MV MEAN PG: 1.35 MMHG
BH CV ECHO MEAS - MV P1/2T: 81.3 MSEC
BH CV ECHO MEAS - MV V2 VTI: 22.2 CM
BH CV ECHO MEAS - MVA(P1/2T): 2.7 CM2
BH CV ECHO MEAS - MVA(VTI): 1.82 CM2
BH CV ECHO MEAS - PA V2 MAX: 153.3 CM/SEC
BH CV ECHO MEAS - PULM A REVS DUR: 0.13 SEC
BH CV ECHO MEAS - PULM A REVS VEL: 34.8 CM/SEC
BH CV ECHO MEAS - PULM DIAS VEL: 33.8 CM/SEC
BH CV ECHO MEAS - PULM S/D: 2.2
BH CV ECHO MEAS - PULM SYS VEL: 74.4 CM/SEC
BH CV ECHO MEAS - RV MAX PG: 2.47 MMHG
BH CV ECHO MEAS - RV V1 MAX: 78.6 CM/SEC
BH CV ECHO MEAS - RV V1 VTI: 14.9 CM
BH CV ECHO MEAS - RVDD: 2.7 CM
BH CV ECHO MEAS - SV(LVOT): 40.5 ML
BH CV ECHO MEAS - SV(MOD-SP4): 41 ML
BH CV ECHO MEAS - TAPSE (>1.6): 3.1 CM
BH CV ECHO MEAS - TR MAX PG: 16.6 MMHG
BH CV ECHO MEAS - TR MAX VEL: 203.9 CM/SEC
BH CV ECHO MEASUREMENTS AVERAGE E/E' RATIO: 7.83
BH CV NUCLEAR PRIOR STUDY: 3
BH CV REST NUCLEAR ISOTOPE DOSE: 11 MCI
BH CV STRESS BP STAGE 1: NORMAL
BH CV STRESS BP STAGE 2: NORMAL
BH CV STRESS BP STAGE 3: NORMAL
BH CV STRESS COMMENTS STAGE 1: NORMAL
BH CV STRESS COMMENTS STAGE 2: NORMAL
BH CV STRESS DOSE REGADENOSON STAGE 1: 0.4
BH CV STRESS DURATION MIN STAGE 1: 0
BH CV STRESS DURATION MIN STAGE 2: 4
BH CV STRESS DURATION SEC STAGE 1: 10
BH CV STRESS DURATION SEC STAGE 2: 0
BH CV STRESS HR STAGE 1: 97
BH CV STRESS HR STAGE 2: 98
BH CV STRESS HR STAGE 3: 97
BH CV STRESS NUCLEAR ISOTOPE DOSE: 33 MCI
BH CV STRESS PROTOCOL 1: NORMAL
BH CV STRESS RECOVERY BP: NORMAL MMHG
BH CV STRESS RECOVERY HR: 92 BPM
BH CV STRESS STAGE 1: 1
BH CV STRESS STAGE 2: 2
BH CV STRESS STAGE 3: 3
BH CV XLRA - TDI S': 12.6 CM/SEC
MAXIMAL PREDICTED HEART RATE: 151 BPM
PERCENT MAX PREDICTED HR: 64.9 %
QT INTERVAL: 445 MS
QTC INTERVAL: 481 MS
SINUS: 2.8 CM
STJ: 2.17 CM
STRESS BASELINE BP: NORMAL MMHG
STRESS BASELINE HR: 76 BPM
STRESS PERCENT HR: 76 %
STRESS POST PEAK BP: NORMAL MMHG
STRESS POST PEAK HR: 98 BPM
STRESS TARGET HR: 128 BPM

## 2025-04-02 PROCEDURE — 93306 TTE W/DOPPLER COMPLETE: CPT

## 2025-04-02 PROCEDURE — 63710000001 DIPHENHYDRAMINE PER 50 MG: Performed by: INTERNAL MEDICINE

## 2025-04-02 PROCEDURE — 93018 CV STRESS TEST I&R ONLY: CPT | Performed by: INTERNAL MEDICINE

## 2025-04-02 PROCEDURE — 34310000005 TECHNETIUM TETROFOSMIN KIT: Performed by: INTERNAL MEDICINE

## 2025-04-02 PROCEDURE — 93017 CV STRESS TEST TRACING ONLY: CPT

## 2025-04-02 PROCEDURE — 78452 HT MUSCLE IMAGE SPECT MULT: CPT | Performed by: INTERNAL MEDICINE

## 2025-04-02 PROCEDURE — G0378 HOSPITAL OBSERVATION PER HR: HCPCS

## 2025-04-02 PROCEDURE — A9502 TC99M TETROFOSMIN: HCPCS | Performed by: INTERNAL MEDICINE

## 2025-04-02 PROCEDURE — 78452 HT MUSCLE IMAGE SPECT MULT: CPT

## 2025-04-02 PROCEDURE — 93306 TTE W/DOPPLER COMPLETE: CPT | Performed by: INTERNAL MEDICINE

## 2025-04-02 PROCEDURE — 25010000002 REGADENOSON 0.4 MG/5ML SOLUTION: Performed by: INTERNAL MEDICINE

## 2025-04-02 PROCEDURE — 99214 OFFICE O/P EST MOD 30 MIN: CPT | Performed by: INTERNAL MEDICINE

## 2025-04-02 RX ORDER — DIPHENHYDRAMINE HCL 25 MG
25 CAPSULE ORAL NIGHTLY PRN
Status: DISCONTINUED | OUTPATIENT
Start: 2025-04-02 | End: 2025-04-03 | Stop reason: HOSPADM

## 2025-04-02 RX ORDER — REGADENOSON 0.08 MG/ML
0.4 INJECTION, SOLUTION INTRAVENOUS
Status: COMPLETED | OUTPATIENT
Start: 2025-04-02 | End: 2025-04-02

## 2025-04-02 RX ADMIN — TETROFOSMIN 1 DOSE: 1.38 INJECTION, POWDER, LYOPHILIZED, FOR SOLUTION INTRAVENOUS at 15:05

## 2025-04-02 RX ADMIN — ACETAMINOPHEN 650 MG: 325 TABLET, FILM COATED ORAL at 22:18

## 2025-04-02 RX ADMIN — ASPIRIN 81 MG: 81 TABLET, COATED ORAL at 09:39

## 2025-04-02 RX ADMIN — AMLODIPINE BESYLATE 5 MG: 5 TABLET ORAL at 09:41

## 2025-04-02 RX ADMIN — LOSARTAN POTASSIUM 50 MG: 50 TABLET, FILM COATED ORAL at 09:39

## 2025-04-02 RX ADMIN — Medication 10 ML: at 19:48

## 2025-04-02 RX ADMIN — MONTELUKAST 10 MG: 10 TABLET, FILM COATED ORAL at 19:48

## 2025-04-02 RX ADMIN — Medication 10 ML: at 09:43

## 2025-04-02 RX ADMIN — REGADENOSON 0.4 MG: 0.08 INJECTION, SOLUTION INTRAVENOUS at 15:04

## 2025-04-02 RX ADMIN — TETROFOSMIN 1 DOSE: 1.38 INJECTION, POWDER, LYOPHILIZED, FOR SOLUTION INTRAVENOUS at 11:26

## 2025-04-02 RX ADMIN — DIPHENHYDRAMINE HYDROCHLORIDE 25 MG: 25 CAPSULE ORAL at 22:18

## 2025-04-02 RX ADMIN — ESCITALOPRAM 20 MG: 10 TABLET, FILM COATED ORAL at 09:39

## 2025-04-02 RX ADMIN — LOSARTAN POTASSIUM 50 MG: 50 TABLET, FILM COATED ORAL at 19:48

## 2025-04-02 RX ADMIN — ROSUVASTATIN CALCIUM 5 MG: 5 TABLET, FILM COATED ORAL at 19:48

## 2025-04-02 NOTE — PROGRESS NOTES
LOS: 0 days   Patient Care Team:  Milagros Pal MD as PCP - General (Family Medicine)  Brett Zhou MD as Consulting Physician (Cardiology)    Subjective     Patient denies any new complaints    Review of Systems   Constitutional: Negative.    HENT: Negative.     Respiratory: Negative.     Cardiovascular: Negative.    Gastrointestinal: Negative.    Genitourinary: Negative.    Musculoskeletal: Negative.    Neurological: Negative.    Psychiatric/Behavioral: Negative.             Objective     Vital Signs  Temp:  [97.4 °F (36.3 °C)-98.4 °F (36.9 °C)] 98.3 °F (36.8 °C)  Heart Rate:  [60-76] 71  Resp:  [11-20] 17  BP: (115-164)/(43-83) 144/76      Physical Exam  Vitals reviewed.   Constitutional:       Appearance: She is not ill-appearing.   HENT:      Head: Normocephalic and atraumatic.      Right Ear: External ear normal.      Left Ear: External ear normal.      Nose: Nose normal.      Mouth/Throat:      Mouth: Mucous membranes are dry.   Eyes:      General:         Right eye: No discharge.         Left eye: No discharge.   Cardiovascular:      Rate and Rhythm: Normal rate and regular rhythm.      Pulses: Normal pulses.      Heart sounds: Normal heart sounds.   Pulmonary:      Effort: Pulmonary effort is normal.      Breath sounds: Normal breath sounds.   Abdominal:      General: Bowel sounds are normal.      Palpations: Abdomen is soft.   Musculoskeletal:         General: Normal range of motion.      Cervical back: Normal range of motion.   Skin:     General: Skin is warm and dry.   Neurological:      Mental Status: She is alert and oriented to person, place, and time.   Psychiatric:         Behavior: Behavior normal.              Results Review:    Lab Results (last 24 hours)       Procedure Component Value Units Date/Time    High Sensitivity Troponin T 1Hr [[id number]]  (Normal) Collected: 04/01/25 1444    Specimen: Blood Updated: 04/01/25 1510     HS Troponin T 6 ng/L      Troponin T Numeric Delta -1  ng/L     Narrative:      High Sensitive Troponin T Reference Range:  <14.0 ng/L- Negative Female for AMI  <22.0 ng/L- Negative Male for AMI  >=14 - Abnormal Female indicating possible myocardial injury.  >=22 - Abnormal Male indicating possible myocardial injury.   Clinicians would have to utilize clinical acumen, EKG, Troponin, and serial changes to determine if it is an Acute Myocardial Infarction or myocardial injury due to an underlying chronic condition.                  Imaging Results (Last 24 Hours)       ** No results found for the last 24 hours. **                 I reviewed the patient's new clinical results.    Medication Review:   Scheduled Meds:amLODIPine, 5 mg, Oral, Daily  aspirin, 81 mg, Oral, Daily  escitalopram, 20 mg, Oral, Daily  losartan, 50 mg, Oral, BID  montelukast, 10 mg, Oral, Nightly  regadenoson, 0.4 mg, Intravenous, Once in imaging  rosuvastatin, 5 mg, Oral, Nightly  sodium chloride, 10 mL, Intravenous, Q12H  technetium tetrofosmin, 1 dose, Intravenous, Once in imaging      Continuous Infusions:   PRN Meds:.  acetaminophen **OR** acetaminophen **OR** acetaminophen    senna-docusate sodium **AND** polyethylene glycol **AND** bisacodyl **AND** bisacodyl    Calcium Replacement - Follow Nurse / BPA Driven Protocol    hydrALAZINE    Magnesium Standard Dose Replacement - Follow Nurse / BPA Driven Protocol    nitroglycerin    ondansetron ODT **OR** ondansetron    Phosphorus Replacement - Follow Nurse / BPA Driven Protocol    Potassium Replacement - Follow Nurse / BPA Driven Protocol    [COMPLETED] Insert Peripheral IV **AND** sodium chloride    sodium chloride    sodium chloride     Interval History:    Assessment & Plan     Chest pain  Asthma  Hypertension  LBBB (left bundle branch block)  Chest pressure  Chronic coronary artery disease     Patient has ruled out for acute coronary syndrome but but had abnormal cath and will proceed with heart cath     Home medications for her other chronic  medical problems) hypertension, hyperlipidemia, mood disorder, and asthma) will be continued.       Diet:cardiac  DVT prophylaxis:scd  GI prophylaxis:protonix    CODE STATUS:  Code status (Patient has no pulse and is not breathing):full  Medical Interventions (Patient has pulse or is breathing):full support  Level of Support Discussed with :patient    Admission Status:observation    Expected length of Stay: 1 to 2 days     Plan for disposition:Home    CAMERON Leija  04/02/25  15:00 EDT

## 2025-04-02 NOTE — CONSULTS
Referring Provider: Gabriela Ewing MD  Reason for Consultation:  Chest discomfort  Chronic coronary artery disease    Patient Care Team:  Milagros Pal MD as PCP - General (Family Medicine)  Brett Zhou MD as Consulting Physician (Cardiology)    Chief complaint  Chest discomfort    Subjective .     History of present illness:  Aleah Coleman is a 70 y.o. female who presents with history of cardiac and noncardiac problems as outlined in the assessment was admitted to the hospital with history of substernal heaviness and tightness sensation that woke her up 2 days back last for about 2 to 3 hours.  Patient did not have any associated aggravating or elevating factors with it.  Patient also has some chest discomfort with walking at the grocery store.  No associated sweating shortness of breath palpitations dizziness or syncope.  Patient came to the ER.  EKG showed no acute changes.  Patient has chronic borderline coronary artery disease in the past.  Troponin levels were negative.  Cardiology consultation was requested.    ROS      Today, the patient has been without any chest discomfort ,shortness of breath, palpitations, dizziness or syncope.  Denies having any headache ,abdominal pain ,nausea, vomiting , diarrhea constipation, loss of weight or loss of appetite.  Denies having any excessive bruising ,hematuria or blood in the stool.    Review of all systems negative except as indicated.    Reviewed ROS.      History  Past Medical History:   Diagnosis Date    Abnormal ECG Don’t  know    Asthma     Cholelithiasis ?    Not sure of year    Chronic coronary artery disease 4/1/2025    COPD (chronic obstructive pulmonary disease)     Hyperlipidemia     Hypertension     Liver disease fatty    PONV (postoperative nausea and vomiting)     Sleep apnea     on a cpap since 5/2019       Past Surgical History:   Procedure Laterality Date    APPENDECTOMY      BACK SURGERY      Disc    CARDIAC CATHETERIZATION N/A  2020    Procedure: Left Heart Cath with Coronary Angiography;  Surgeon: Brett Zhou MD;  Location: [facility] CATH INVASIVE LOCATION;  Service: Cardiovascular    CARDIAC CATHETERIZATION N/A 2020    Procedure: Left ventriculography;  Surgeon: Brett Zhou MD;  Location: [facility] CATH INVASIVE LOCATION;  Service: Cardiovascular    CHOLECYSTECTOMY      EPIGASTRIC HERNIA REPAIR N/A 2023    Procedure: EPIGASTRIC HERNIA REPAIR;  Surgeon: Joseph Kline MD;  Location: [facility] MAIN OR;  Service: General;  Laterality: N/A;    HEMORRHOIDECTOMY  1970    HYSTERECTOMY         Family History   Problem Relation Age of Onset    Stroke Mother     Alcohol abuse Mother     Stomach cancer Father     Heart attack Sister     Heart disease Sister     Arthritis Sister     Miscarriages / Stillbirths Sister     Drug abuse Sister     Heart disease Brother     Heart disease Brother     Hypertension Brother     Kidney disease Brother     Heart disease Brother     Hearing loss Brother     Heart disease Maternal Grandmother     Heart disease Maternal Grandfather     No Known Problems Paternal Grandmother     No Known Problems Paternal Grandfather     Ovarian cancer Maternal Aunt     No Known Problems Maternal Uncle     No Known Problems Paternal Aunt     No Known Problems Paternal Uncle     No Known Problems Other     Anemia Neg Hx     Arrhythmia Neg Hx     Asthma Neg Hx     Clotting disorder Neg Hx     Fainting Neg Hx     Heart failure Neg Hx     Hyperlipidemia Neg Hx        Social History     Tobacco Use    Smoking status: Former     Current packs/day: 0.00     Average packs/day: 3.0 packs/day for 20.0 years (60.0 ttl pk-yrs)     Types: Cigarettes     Start date: 1968     Quit date: 1988     Years since quittin.2     Passive exposure: Past    Smokeless tobacco: Never   Vaping Use    Vaping status: Never Used   Substance Use Topics    Alcohol use: Not Currently     Comment: 1-2 glass a month    Drug  use: Never     Comment: CBD oils        Medications Prior to Admission   Medication Sig Dispense Refill Last Dose/Taking    albuterol sulfate  (90 Base) MCG/ACT inhaler Inhale 2 puffs Every 4 (Four) Hours As Needed for Wheezing.       amLODIPine (NORVASC) 5 MG tablet Take 1 tablet by mouth Daily.       aspirin 81 MG EC tablet Take 1 tablet by mouth Daily. LD 7/7       cetirizine (zyrTEC) 10 MG tablet Take 1 tablet by mouth Daily.       escitalopram (LEXAPRO) 20 MG tablet Take 1 tablet by mouth Daily.       Ibuprofen (ADVIL PO) Take  by mouth.       losartan (COZAAR) 50 MG tablet Take 1 tablet by mouth 2 (Two) Times a Day. HOLD 24 hours before surgery       metoprolol succinate XL (TOPROL-XL) 100 MG 24 hr tablet Take 1 tablet by mouth Daily. TAKE 1 TABLET EVERY MORNINGFOR A TOTAL OF 150MG DAILY     take dos 90 tablet 3     metoprolol succinate XL (TOPROL-XL) 50 MG 24 hr tablet TAKE 1 TABLET EVERY EVENINGFOR A TOTAL OF 150MG DAILY (Patient taking differently: Take 1 tablet by mouth After Dinner. TAKE 1 TABLET EVERY EVENINGFOR A TOTAL OF 150MG DAILY) 90 tablet 3     montelukast (SINGULAIR) 10 MG tablet        Multiple Vitamins-Minerals (CENTRUM SILVER 50+WOMEN PO) Take 1 tablet by mouth Daily. Last dose 7/7       rosuvastatin (CRESTOR) 5 MG tablet Take 1 tablet by mouth Daily.       TRELEGY ELLIPTA 100-62.5-25 MCG/INH aerosol powder  Inhale 1 puff Daily.            Penicillin g and Vancomycin    Scheduled Meds:amLODIPine, 5 mg, Oral, Daily  aspirin, 81 mg, Oral, Daily  escitalopram, 20 mg, Oral, Daily  losartan, 50 mg, Oral, BID  montelukast, 10 mg, Oral, Nightly  rosuvastatin, 5 mg, Oral, Nightly  sodium chloride, 10 mL, Intravenous, Q12H      Continuous Infusions:   PRN Meds:.  acetaminophen **OR** acetaminophen **OR** acetaminophen    senna-docusate sodium **AND** polyethylene glycol **AND** bisacodyl **AND** bisacodyl    Calcium Replacement - Follow Nurse / BPA Driven Protocol    hydrALAZINE    Magnesium  "Standard Dose Replacement - Follow Nurse / BPA Driven Protocol    nitroglycerin    ondansetron ODT **OR** ondansetron    Phosphorus Replacement - Follow Nurse / BPA Driven Protocol    Potassium Replacement - Follow Nurse / BPA Driven Protocol    [COMPLETED] Insert Peripheral IV **AND** sodium chloride    sodium chloride    sodium chloride    Objective     VITAL SIGNS  Vitals:    04/02/25 0100 04/02/25 0200 04/02/25 0300 04/02/25 0358   BP: 121/51 132/66 141/67 116/58   BP Location:    Left arm   Patient Position:    Lying   Pulse: 65 63 61 62   Resp:    12   Temp:    97.8 °F (36.6 °C)   TempSrc:    Oral   SpO2: 93% 91% 91% 93%   Weight:       Height:           Flowsheet Rows      Flowsheet Row First Filed Value   Admission Height 157.5 cm (62\") Documented at 04/01/2025 1322   Admission Weight 78.8 kg (173 lb 11.6 oz) Documented at 04/01/2025 1322            No intake or output data in the 24 hours ending 04/02/25 0646     TELEMETRY: Sinus rhythm    Physical Exam:  The patient is alert, oriented and in no distress.  Vital signs as noted above.     Head and neck revealed no carotid bruits or jugular venous distension.  No thyromegaly or lymphadenopathy is present.     Lungs clear.  No wheezing.  Breath sounds are normal bilaterally.     Heart normal first and second heart sounds.  No murmur..  No pericardial rub is present.  No gallop is present.     Abdomen soft and nontender.  No organomegaly is present.     Extremities revealed good peripheral pulses without any pedal edema.     Skin warm and dry.     Musculoskeletal system is grossly normal.     CNS grossly normal.    Reviewed and updated.    Results Review:   I reviewed the patient's new clinical results.  Lab Results (last 24 hours)       Procedure Component Value Units Date/Time    High Sensitivity Troponin T 1Hr [[id number]]  (Normal) Collected: 04/01/25 1444    Specimen: Blood Updated: 04/01/25 1510     HS Troponin T 6 ng/L      Troponin T Numeric Delta -1 " ng/L     Narrative:      High Sensitive Troponin T Reference Range:  <14.0 ng/L- Negative Female for AMI  <22.0 ng/L- Negative Male for AMI  >=14 - Abnormal Female indicating possible myocardial injury.  >=22 - Abnormal Male indicating possible myocardial injury.   Clinicians would have to utilize clinical acumen, EKG, Troponin, and serial changes to determine if it is an Acute Myocardial Infarction or myocardial injury due to an underlying chronic condition.         Basic Metabolic Panel [[id number]]  (Abnormal) Collected: 04/01/25 1340    Specimen: Blood Updated: 04/01/25 1406     Glucose 133 mg/dL      BUN 16 mg/dL      Creatinine 0.88 mg/dL      Sodium 139 mmol/L      Potassium 3.8 mmol/L      Chloride 101 mmol/L      CO2 24.9 mmol/L      Calcium 10.1 mg/dL      BUN/Creatinine Ratio 18.2     Anion Gap 13.1 mmol/L      eGFR 71.2 mL/min/1.73     Narrative:      GFR Categories in Chronic Kidney Disease (CKD)      GFR Category          GFR (mL/min/1.73)    Interpretation  G1                     90 or greater         Normal or high (1)  G2                      60-89                Mild decrease (1)  G3a                   45-59                Mild to moderate decrease  G3b                   30-44                Moderate to severe decrease  G4                    15-29                Severe decrease  G5                    14 or less           Kidney failure          (1)In the absence of evidence of kidney disease, neither GFR category G1 or G2 fulfill the criteria for CKD.    eGFR calculation 2021 CKD-EPI creatinine equation, which does not include race as a factor    High Sensitivity Troponin T [[id number]]  (Normal) Collected: 04/01/25 1340    Specimen: Blood Updated: 04/01/25 1406     HS Troponin T 7 ng/L     Narrative:      High Sensitive Troponin T Reference Range:  <14.0 ng/L- Negative Female for AMI  <22.0 ng/L- Negative Male for AMI  >=14 - Abnormal Female indicating possible myocardial injury.  >=22 - Abnormal  Male indicating possible myocardial injury.   Clinicians would have to utilize clinical acumen, EKG, Troponin, and serial changes to determine if it is an Acute Myocardial Infarction or myocardial injury due to an underlying chronic condition.         BNP [[id number]]  (Normal) Collected: 04/01/25 1340    Specimen: Blood Updated: 04/01/25 1406     proBNP 159.0 pg/mL     Narrative:      This assay is used as an aid in the diagnosis of individuals suspected of having heart failure. It can be used as an aid in the diagnosis of acute decompensated heart failure (ADHF) in patients presenting with signs and symptoms of ADHF to the emergency department (ED). In addition, NT-proBNP of <300 pg/mL indicates ADHF is not likely.    Age Range Result Interpretation  NT-proBNP Concentration (pg/mL:      <50             Positive            >450                   Gray                 300-450                    Negative             <300    50-75           Positive            >900                  Gray                300-900                  Negative            <300      >75             Positive            >1800                  Gray                300-1800                  Negative            <300    aPTT [[id number]]  (Normal) Collected: 04/01/25 1340    Specimen: Blood Updated: 04/01/25 1352     PTT 25.7 seconds     Protime-INR [[id number]]  (Normal) Collected: 04/01/25 1340    Specimen: Blood Updated: 04/01/25 1352     Protime 13.7 Seconds      INR 1.06    CBC & Differential [[id number]]  (Normal) Collected: 04/01/25 1340    Specimen: Blood Updated: 04/01/25 1344    Narrative:      The following orders were created for panel order CBC & Differential.  Procedure                               Abnormality         Status                     ---------                               -----------         ------                     CBC Auto Differential[[id number]]        Normal              Final result                 Please view results for  these tests on the individual orders.    CBC Auto Differential [[id number]]  (Normal) Collected: 04/01/25 1340    Specimen: Blood Updated: 04/01/25 1344     WBC 5.69 10*3/mm3      RBC 4.80 10*6/mm3      Hemoglobin 14.4 g/dL      Hematocrit 43.2 %      MCV 90.0 fL      MCH 30.0 pg      MCHC 33.3 g/dL      RDW 12.8 %      RDW-SD 42.3 fl      MPV 10.2 fL      Platelets 239 10*3/mm3      Neutrophil % 65.1 %      Lymphocyte % 21.6 %      Monocyte % 9.5 %      Eosinophil % 2.3 %      Basophil % 1.1 %      Immature Grans % 0.4 %      Neutrophils, Absolute 3.71 10*3/mm3      Lymphocytes, Absolute 1.23 10*3/mm3      Monocytes, Absolute 0.54 10*3/mm3      Eosinophils, Absolute 0.13 10*3/mm3      Basophils, Absolute 0.06 10*3/mm3      Immature Grans, Absolute 0.02 10*3/mm3      nRBC 0.0 /100 WBC             Imaging Results (Last 24 Hours)       Procedure Component Value Units Date/Time    XR Chest 1 View [[id number]] Collected: 04/01/25 1353     Updated: 04/01/25 1356    Narrative:      XR CHEST 1 VW    Date of Exam: 4/1/2025 1:42 PM EDT    Indication: chest pain    Comparison: 7/13/2023    Findings:  Cardiomediastinal silhouette is unremarkable. Minimal basal atelectasis. Trace left effusion. No pneumothorax. No acute osseous abnormality identified.      Impression:      Impression:  Minimal basal atelectasis and trace left effusion.        Electronically Signed: Geovanni Ortega MD    4/1/2025 1:53 PM EDT    Workstation ID: [id number]        LAB RESULTS (LAST 7 DAYS)    CBC  Results from last 7 days   Lab Units 04/01/25  1340   WBC 10*3/mm3 5.69   RBC 10*6/mm3 4.80   HEMOGLOBIN g/dL 14.4   HEMATOCRIT % 43.2   MCV fL 90.0   PLATELETS 10*3/mm3 239       BMP  Results from last 7 days   Lab Units 04/01/25  1340   SODIUM mmol/L 139   POTASSIUM mmol/L 3.8   CHLORIDE mmol/L 101   CO2 mmol/L 24.9   BUN mg/dL 16   CREATININE mg/dL 0.88   GLUCOSE mg/dL 133*       CMP   Results from last 7 days   Lab Units 04/01/25  1340   SODIUM mmol/L 139    POTASSIUM mmol/L 3.8   CHLORIDE mmol/L 101   CO2 mmol/L 24.9   BUN mg/dL 16   CREATININE mg/dL 0.88   GLUCOSE mg/dL 133*         BNP        TROPONIN  Results from last 7 days   Lab Units 04/01/25  1444   HSTROP T ng/L 6       CoAg  Results from last 7 days   Lab Units 04/01/25  1340   INR  1.06   APTT seconds 25.7       Creatinine Clearance  Estimated Creatinine Clearance: 58.7 mL/min (by C-G formula based on SCr of 0.88 mg/dL).    ABG        Radiology  XR Chest 1 View  Result Date: 4/1/2025  Impression: Minimal basal atelectasis and trace left effusion. Electronically Signed: Geovanni Ortega MD  4/1/2025 1:53 PM EDT  Workstation ID: [id number]        EKG      I personally viewed and interpreted the patient's EKG/Telemetry data: Sinus rhythm left bundle branch block    ECHOCARDIOGRAM:    Results for orders placed during the hospital encounter of 01/23/20    Adult Transthoracic Echo Complete W/ Cont if Necessary Per Protocol    Interpretation Summary  · Estimated EF = 55%.  · Left ventricular systolic function is normal.    Indications  Chest pain  Shortness of breath    Technically satisfactory study.  Mitral valve is structurally normal.  Tricuspid valve is structurally normal.  Aortic valve is structurally normal.  Pulmonic valve could not be well visualized.  No evidence for mitral tricuspid or aortic regurgitation is seen by Doppler study.  Left atrium is normal in size.  Right atrium is normal in size.  Left ventricle is normal in size and mild diffuse hypocontractility with ejection fraction of 55%  Right ventricle is normal in size.  Atrial septum is intact.  Aorta is normal.  No pericardial effusion or intracardiac thrombus is seen.    Impression  Structurally and functionally normal cardiac valves.  Mild diffuse hypocontractility of the left ventricle with ejection fraction of 55%.      STRESS TEST  Results for orders placed during the hospital encounter of 01/23/20    Stress Test With Myocardial Perfusion  One Day    Interpretation Summary  Stress portion of this exam was supervised by: Brett Zhou MD      Indications  Chest pain  Shortness of breath    Indications  Chest pain  Shortness of breath    This study was performed under my direct supervision.    Resting ECG  Normal sinus rhythm left bundle branch block right axis deviation    Patient exercised for 7 minutes using the Raffaele protocol.  The maximum heart rate achieved was 150 and maximum systolic blood pressure 190.  Patient had chest discomfort with exercise without ST-T wave abnormalities or ectopy.    Cardiolite was used as an imaging agent.    Cardiolite images showed decreased radionuclide uptake in the anterior and apical segments.    Gated SPECT images revealed normal left ventricular size and apical hypocontractility with ejection fraction of 50%    Impression    Fair exercise tolerance.    Abnormal stress Cardiolite test with anterior and apical ischemia    Gated SPECT images revealed normal left ventricular size and apical hypocontractility with ejection fraction of 50%        Cardiolite (Tc-99m sestamibi) stress test    HEART CATHETERIZATION  Results for orders placed during the hospital encounter of 01/24/20    Cardiac Catheterization/Vascular Study    Narrative  CARDIAC CATHETERIZATION REPORT    DATE OF PROCEDURE: 1/24/2020    INDICATION FOR PROCEDURE:  Angina  Positive stress Cardiolite test    PROCEDURE PERFORMED: Left heart catheterization, coronary angiography, left ventriculography    PROCEDURE COMMENTS:    Under usual sterile precautions and 1% Xylocaine infiltration right femoral artery was percutaneously punctured and a 5 Maltese vascular sheath was introduced into the right femoral artery.  Left and right coronary arterial view was performed in varying degrees of obliquity followed by left ventricular angiogram.  Hemostasis was obtained and patient was returned to the room with intact pulses.  No complications were  noted.    FINDINGS:    1. HEMODYNAMICS:  Left ventricle end-diastolic pressure is normal.  No gradient was noted across aortic valve.    2. LEFT VENTRICULOGRAPHY:  Left ventricle size and contractility is normal with ejection fraction of 60%.    3. CORONARY ANGIOGRAPHY:  Left main coronary artery normal.  Left anterior descending artery has diffuse calcification with mid segment 50% disease.  Circumflex coronary artery has ostial 50% disease.  Codominant vessel.  Right coronary artery is a codominant vessel that has 30 to 40% disease in the proximal segment.    SUMMARY:  Left ventricle size and contractility is normal with ejection fraction of 60%.    Left main coronary artery normal.  Left anterior descending artery has diffuse calcification with mid segment 50% disease.  Circumflex coronary artery has ostial 50% disease.  Codominant vessel.  Right coronary artery is a codominant vessel that has 30 to 40% disease in the proximal segment.    RECOMMENDATIONS:  Patient has significant symptoms of angina pectoris and had positive stress Cardiolite test with anterior and apical ischemia.  Cardiac catheterization revealed disease which is not critical.  Medical treatment and modification of risk factors.  Patient will be started on metoprolol XL 25 mg a day.  Follow-up in the office in 2 weeks.      OTHER:     Assessment & Plan     Principal Problem:    Chest pain  Active Problems:    Asthma    Hypertension    LBBB (left bundle branch block)    Chest pressure    Chronic coronary artery disease    ]]]]]]]]]]]]]]]]]]  History  ==========  -Chest discomfort suggestive of possible angina pectoris.  EKG showed sinus rhythm left bundle branch block  Troponin levels are negative.    - Chronic shortness of breath    -Chronic coronary artery disease     Cardiac catheterization 1/24/2020  Left ventricle size and contractility is normal with ejection fraction of 60%.  Left main coronary artery normal.  Left anterior descending  artery has diffuse calcification with mid segment 50% disease.  Circumflex coronary artery has ostial 50% disease.  Codominant vessel.  Right coronary artery is a codominant vessel that has 30 to 40% disease in the proximal segment.     Echocardiogram showed mild diffuse hypocontractility with ejection fraction of 55%.  Stress Cardiolite test abnormal with anterior and apical ischemia.     -Hypertension dyslipidemia.  History of asthma.     -Chronic left bundle branch block     -Strong family history of coronary artery disease     -Status post appendectomy back surgery cholecystectomy and hysterectomy.     -Former smoker     -Allergic to penicillin and vancomycin  ==========  Plan  ========  Chest discomfort suggestive of angina pectoris.  Troponin levels are negative.  EKG showed no acute changes.  Chronic left bundle branch block is present.    Chronic coronary artery disease.  Mid LAD diffuse disease 50% and ostial 50% circumflex coronary artery disease 30 to 40% RCA disease.  (Cardiac cath 2020)    Stress Cardiolite test  Echocardiogram  Patient may need cardiac catheterization and coronary arteriography.     Hypertension  116/58    Dyslipidemia-continue Crestor.    Chronic left bundle branch block-stable and asymptomatic.    Medications were reviewed and updated.  Current medications include aspirin amlodipine losartan Singulair Crestor    Further plan will depend on patient's progress.    Reviewed and updated 4/2/2025.  ]]]]]]]]]]]]]]]  Echocardiogram 4/2/2025    Structurally and functionally normal cardiac valves.  Left ventricular size and contractility is normal with ejection fraction of 60%.  Grade 1 left ventricular diastolic dysfunction is present.  Right ventricle is normal in size and contractility with a TAPSE of 3.1 cm.    Stress Cardiolite test from 4/2/2025  Lexiscan Cardiolite test revealed mild apical ischemia.    Gated SPECT images revealed normal left ventricular size and contractility with  ejection fraction of 71%.    Suggest clinical correlation.      Brett Zhou MD  04/02/25  06:46 EDT

## 2025-04-02 NOTE — CASE MANAGEMENT/SOCIAL WORK
Continued Stay Note  [facility]     Patient Name: Aleah Coleman  MRN: [id number]  Today's Date: 4/2/2025    Admit Date: 4/1/2025        Discharge Plan       Row Name 04/02/25 1113       Plan    Plan Comments DC Barriers: stress test today, echo, poss cardiac cath, cardio following             Octavia Lomeli RN     [facility]  Office: [phone]  Cell: [phone]  Fax # [phone]

## 2025-04-02 NOTE — CASE MANAGEMENT/SOCIAL WORK
Discharge Planning Assessment   [place]     Patient Name: Aleah Coleman  MRN: [id number]  Today's Date: 4/2/2025    Admit Date: 4/1/2025    Plan: Return home with spouse   Discharge Needs Assessment       Row Name 04/02/25 1339       Living Environment    People in Home spouse    Name(s) of People in Home  [place]    Current Living Arrangements home    Potentially Unsafe Housing Conditions none    In the past 12 months has the electric, gas, oil, or water company threatened to shut off services in your home? No    Primary Care Provided by self    Provides Primary Care For no one    Family Caregiver if Needed spouse    Family Caregiver Names  [place]    Quality of Family Relationships helpful;involved;supportive    Able to Return to Prior Arrangements yes       Resource/Environmental Concerns    Resource/Environmental Concerns none    Transportation Concerns none       Transportation Needs    In the past 12 months, has lack of transportation kept you from medical appointments or from getting medications? no    In the past 12 months, has lack of transportation kept you from meetings, work, or from getting things needed for daily living? No       Food Insecurity    Within the past 12 months, you worried that your food would run out before you got the money to buy more. Never true    Within the past 12 months, the food you bought just didn't last and you didn't have money to get more. Never true       Transition Planning    Patient/Family Anticipates Transition to home with family    Patient/Family Anticipated Services at Transition none    Transportation Anticipated family or friend will provide       Discharge Needs Assessment    Readmission Within the Last 30 Days no previous admission in last 30 days    Equipment Currently Used at Home cpap    Concerns to be Addressed care coordination/care conferences;discharge planning    Do you want help finding or keeping work or a job? Patient declined    Do you want  help with school or training? For example, starting or completing job training or getting a high school diploma, GED or equivalent Patient declined    Anticipated Changes Related to Illness none    Equipment Needed After Discharge none              Discharge Plan       Row Name 04/02/25 9068       Plan    Plan Return home with spouse    Plan Comments CM met with patient at bedside to discuss discharge planning. Patient lives at home with her  [place]. She is independent with ADLs and drives. She has a cpap at home and denies other DME needs. PCP ([facility]) and [facility] ([facility]) confirmed, declines [facility]. Denies issues affording medications, utilities, and/or food. No current HHC or OPPT. Spouse will transport at discharge.                Demographic Summary       Row Name 04/02/25 7352       General Information    Admission Type observation    Arrived From emergency department    Referral Source admission list    Reason for Consult care coordination/care conference;discharge planning    Preferred Language English       Contact Information    Permission Granted to Share Info With                    Functional Status       Row Name 04/02/25 3227       Functional Status    Usual Activity Tolerance good    Current Activity Tolerance good       Functional Status, IADL    Medications independent    Meal Preparation independent    Housekeeping independent    Laundry independent    Shopping independent       Mental Status Summary    Recent Changes in Mental Status/Cognitive Functioning no changes             Octavia Lomeli RN     [facility]  Office: [phone]  Cell: [phone]  Fax # [phone]

## 2025-04-02 NOTE — PLAN OF CARE
Goal Outcome Evaluation:   Pt is A&O x4, VSS, room air. Slight chest pain continued when brought up from ER but denies any chest pain this morning. Plan for stress test in the morning and possible echo.

## 2025-04-03 VITALS
SYSTOLIC BLOOD PRESSURE: 155 MMHG | RESPIRATION RATE: 14 BRPM | TEMPERATURE: 97.7 F | HEART RATE: 72 BPM | OXYGEN SATURATION: 96 % | HEIGHT: 62 IN | BODY MASS INDEX: 31.83 KG/M2 | WEIGHT: 173 LBS | DIASTOLIC BLOOD PRESSURE: 74 MMHG

## 2025-04-03 LAB
ANION GAP SERPL CALCULATED.3IONS-SCNC: 11.6 MMOL/L (ref 5–15)
BASOPHILS # BLD AUTO: 0.05 10*3/MM3 (ref 0–0.2)
BASOPHILS NFR BLD AUTO: 1.1 % (ref 0–1.5)
BUN SERPL-MCNC: 15 MG/DL (ref 8–23)
BUN/CREAT SERPL: 22.7 (ref 7–25)
CALCIUM SPEC-SCNC: 9 MG/DL (ref 8.6–10.5)
CHLORIDE SERPL-SCNC: 104 MMOL/L (ref 98–107)
CO2 SERPL-SCNC: 24.4 MMOL/L (ref 22–29)
CREAT SERPL-MCNC: 0.66 MG/DL (ref 0.57–1)
DEPRECATED RDW RBC AUTO: 43.1 FL (ref 37–54)
EGFRCR SERPLBLD CKD-EPI 2021: 95.1 ML/MIN/1.73
EOSINOPHIL # BLD AUTO: 0.14 10*3/MM3 (ref 0–0.4)
EOSINOPHIL NFR BLD AUTO: 3.2 % (ref 0.3–6.2)
ERYTHROCYTE [DISTWIDTH] IN BLOOD BY AUTOMATED COUNT: 13.2 % (ref 12.3–15.4)
GLUCOSE SERPL-MCNC: 105 MG/DL (ref 65–99)
HCT VFR BLD AUTO: 41 % (ref 34–46.6)
HGB BLD-MCNC: 13.6 G/DL (ref 12–15.9)
IMM GRANULOCYTES # BLD AUTO: 0.01 10*3/MM3 (ref 0–0.05)
IMM GRANULOCYTES NFR BLD AUTO: 0.2 % (ref 0–0.5)
INR PPP: 1.07 (ref 0.9–1.1)
LYMPHOCYTES # BLD AUTO: 1.15 10*3/MM3 (ref 0.7–3.1)
LYMPHOCYTES NFR BLD AUTO: 26.3 % (ref 19.6–45.3)
MCH RBC QN AUTO: 29.8 PG (ref 26.6–33)
MCHC RBC AUTO-ENTMCNC: 33.2 G/DL (ref 31.5–35.7)
MCV RBC AUTO: 89.9 FL (ref 79–97)
MONOCYTES # BLD AUTO: 0.63 10*3/MM3 (ref 0.1–0.9)
MONOCYTES NFR BLD AUTO: 14.4 % (ref 5–12)
NEUTROPHILS NFR BLD AUTO: 2.4 10*3/MM3 (ref 1.7–7)
NEUTROPHILS NFR BLD AUTO: 54.8 % (ref 42.7–76)
NRBC BLD AUTO-RTO: 0 /100 WBC (ref 0–0.2)
PLATELET # BLD AUTO: 232 10*3/MM3 (ref 140–450)
PMV BLD AUTO: 10.2 FL (ref 6–12)
POTASSIUM SERPL-SCNC: 3.7 MMOL/L (ref 3.5–5.2)
PROTHROMBIN TIME: 13.8 SECONDS (ref 11.7–14.2)
RBC # BLD AUTO: 4.56 10*6/MM3 (ref 3.77–5.28)
SODIUM SERPL-SCNC: 140 MMOL/L (ref 136–145)
WBC NRBC COR # BLD AUTO: 4.38 10*3/MM3 (ref 3.4–10.8)

## 2025-04-03 PROCEDURE — 93454 CORONARY ARTERY ANGIO S&I: CPT | Performed by: INTERNAL MEDICINE

## 2025-04-03 PROCEDURE — 99233 SBSQ HOSP IP/OBS HIGH 50: CPT | Performed by: INTERNAL MEDICINE

## 2025-04-03 PROCEDURE — B2111ZZ FLUOROSCOPY OF MULTIPLE CORONARY ARTERIES USING LOW OSMOLAR CONTRAST: ICD-10-PCS | Performed by: INTERNAL MEDICINE

## 2025-04-03 PROCEDURE — 80048 BASIC METABOLIC PNL TOTAL CA: CPT | Performed by: INTERNAL MEDICINE

## 2025-04-03 PROCEDURE — 25010000002 LIDOCAINE 1 % SOLUTION: Performed by: INTERNAL MEDICINE

## 2025-04-03 PROCEDURE — C1894 INTRO/SHEATH, NON-LASER: HCPCS | Performed by: INTERNAL MEDICINE

## 2025-04-03 PROCEDURE — 85610 PROTHROMBIN TIME: CPT | Performed by: INTERNAL MEDICINE

## 2025-04-03 PROCEDURE — 25510000001 IOPAMIDOL PER 1 ML: Performed by: INTERNAL MEDICINE

## 2025-04-03 PROCEDURE — 25010000002 FENTANYL CITRATE (PF) 100 MCG/2ML SOLUTION: Performed by: INTERNAL MEDICINE

## 2025-04-03 PROCEDURE — 25010000002 MIDAZOLAM PER 1 MG: Performed by: INTERNAL MEDICINE

## 2025-04-03 PROCEDURE — C1769 GUIDE WIRE: HCPCS | Performed by: INTERNAL MEDICINE

## 2025-04-03 PROCEDURE — 4A023N7 MEASUREMENT OF CARDIAC SAMPLING AND PRESSURE, LEFT HEART, PERCUTANEOUS APPROACH: ICD-10-PCS | Performed by: INTERNAL MEDICINE

## 2025-04-03 PROCEDURE — 85025 COMPLETE CBC W/AUTO DIFF WBC: CPT | Performed by: INTERNAL MEDICINE

## 2025-04-03 RX ORDER — CLOPIDOGREL BISULFATE 75 MG/1
75 TABLET ORAL DAILY
Qty: 30 TABLET | Refills: 1 | Status: SHIPPED | OUTPATIENT
Start: 2025-04-04

## 2025-04-03 RX ORDER — LIDOCAINE HYDROCHLORIDE 10 MG/ML
INJECTION, SOLUTION INFILTRATION; PERINEURAL
Status: DISCONTINUED | OUTPATIENT
Start: 2025-04-03 | End: 2025-04-03 | Stop reason: HOSPADM

## 2025-04-03 RX ORDER — METOPROLOL SUCCINATE 25 MG/1
25 TABLET, EXTENDED RELEASE ORAL
Status: DISCONTINUED | OUTPATIENT
Start: 2025-04-03 | End: 2025-04-03 | Stop reason: HOSPADM

## 2025-04-03 RX ORDER — CLOPIDOGREL BISULFATE 75 MG/1
75 TABLET ORAL DAILY
Qty: 30 TABLET | Refills: 1 | Status: CANCELLED | OUTPATIENT
Start: 2025-04-03

## 2025-04-03 RX ORDER — ACETAMINOPHEN 325 MG/1
650 TABLET ORAL EVERY 4 HOURS PRN
Status: DISCONTINUED | OUTPATIENT
Start: 2025-04-03 | End: 2025-04-03 | Stop reason: HOSPADM

## 2025-04-03 RX ORDER — METOPROLOL SUCCINATE 25 MG/1
25 TABLET, EXTENDED RELEASE ORAL
Qty: 30 TABLET | Refills: 1 | Status: SHIPPED | OUTPATIENT
Start: 2025-04-04

## 2025-04-03 RX ORDER — CLOPIDOGREL BISULFATE 75 MG/1
75 TABLET ORAL DAILY
Status: DISCONTINUED | OUTPATIENT
Start: 2025-04-03 | End: 2025-04-03 | Stop reason: HOSPADM

## 2025-04-03 RX ORDER — FENTANYL CITRATE 50 UG/ML
INJECTION, SOLUTION INTRAMUSCULAR; INTRAVENOUS
Status: DISCONTINUED | OUTPATIENT
Start: 2025-04-03 | End: 2025-04-03 | Stop reason: HOSPADM

## 2025-04-03 RX ORDER — METOPROLOL SUCCINATE 25 MG/1
25 TABLET, EXTENDED RELEASE ORAL
Qty: 30 TABLET | Refills: 1 | Status: CANCELLED | OUTPATIENT
Start: 2025-04-03

## 2025-04-03 RX ORDER — IOPAMIDOL 755 MG/ML
INJECTION, SOLUTION INTRAVASCULAR
Status: DISCONTINUED | OUTPATIENT
Start: 2025-04-03 | End: 2025-04-03 | Stop reason: HOSPADM

## 2025-04-03 RX ORDER — MIDAZOLAM HYDROCHLORIDE 1 MG/ML
INJECTION, SOLUTION INTRAMUSCULAR; INTRAVENOUS
Status: DISCONTINUED | OUTPATIENT
Start: 2025-04-03 | End: 2025-04-03 | Stop reason: HOSPADM

## 2025-04-03 RX ADMIN — ASPIRIN 81 MG: 81 TABLET, COATED ORAL at 13:45

## 2025-04-03 RX ADMIN — METOPROLOL SUCCINATE 25 MG: 25 TABLET, EXTENDED RELEASE ORAL at 15:31

## 2025-04-03 RX ADMIN — AMLODIPINE BESYLATE 5 MG: 5 TABLET ORAL at 13:45

## 2025-04-03 RX ADMIN — CLOPIDOGREL BISULFATE 75 MG: 75 TABLET, FILM COATED ORAL at 15:31

## 2025-04-03 RX ADMIN — LOSARTAN POTASSIUM 50 MG: 50 TABLET, FILM COATED ORAL at 13:45

## 2025-04-03 RX ADMIN — ESCITALOPRAM 20 MG: 10 TABLET, FILM COATED ORAL at 13:45

## 2025-04-03 RX ADMIN — Medication 10 ML: at 13:57

## 2025-04-03 RX ADMIN — ACETAMINOPHEN 650 MG: 325 TABLET, FILM COATED ORAL at 13:44

## 2025-04-03 NOTE — DISCHARGE SUMMARY
Date of Discharge:  4/3/2025    Discharge Diagnosis:   Chest pain  Asthma  Hypertension  LBBB (left bundle branch block)  Chest pressure  Chronic coronary artery disease    Presenting Problem/History of Present Illness  Active Hospital Problems    Diagnosis  POA    **Chest pain [R07.9]  Yes    Chronic coronary artery disease [I25.10]  Yes    Unstable angina [I20.0]  Unknown    LBBB (left bundle branch block) [I44.7]  Yes    Chest pressure [R07.89]  Yes    Hypertension [I10]  Yes    Asthma [J45.909]  Yes      Resolved Hospital Problems   No resolved problems to display.          Hospital Course    Patient is a 70 y.o. female presented with reviously diagnosed nonobstructive coronary artery disease who presents from home with 2 episodes of substernal chest pain.  She woke in the middle of the night with substernal chest pain that radiated through to her back.  She did not experience any shortness of breath or nausea at that time.  It lasted for about 4 hours and then spontaneously resolved.  Earlier today she was running errands and walking quickly when she noticed that substernal chest pressure returned.  At this point she was short of breath.  She presented to the emergency room for further evaluation.  Symptoms have subsequently resolved.  Previous cardiac workup includes a heart cath in January 2020 that showed 50% disease in the LAD, 30 to 40% disease in the proximal RCA and 50% lesion in the circumflex. Patient had abnormal stress test and heart cath today with ostial circumflex and ramus intermedius disease. Medical treatment she was started on Plavix and metoprolol XL. Consider CABG in the future if patient has continued symptoms. She will continue current therapy and follow up with PCP in 2 weeks and cardiology in a month      Procedures Performed    Procedure(s):  Coronary angiography  -------------------       Consults:   Consults       Date and Time Order Name Status Description    4/1/2025  2:52 PM  Inpatient Cardiology Consult      4/1/2025  2:46 PM Family Medicine Consult              Pertinent Test Results:    Lab Results (most recent)       Procedure Component Value Units Date/Time    Basic Metabolic Panel [[id number]]  (Abnormal) Collected: 04/03/25 0525    Specimen: Blood from Arm, Right Updated: 04/03/25 0555     Glucose 105 mg/dL      BUN 15 mg/dL      Creatinine 0.66 mg/dL      Sodium 140 mmol/L      Potassium 3.7 mmol/L      Comment: Specimen hemolyzed.  Result may be falsely elevated.        Chloride 104 mmol/L      CO2 24.4 mmol/L      Calcium 9.0 mg/dL      BUN/Creatinine Ratio 22.7     Anion Gap 11.6 mmol/L      eGFR 95.1 mL/min/1.73     Narrative:      GFR Categories in Chronic Kidney Disease (CKD)      GFR Category          GFR (mL/min/1.73)    Interpretation  G1                     90 or greater         Normal or high (1)  G2                      60-89                Mild decrease (1)  G3a                   45-59                Mild to moderate decrease  G3b                   30-44                Moderate to severe decrease  G4                    15-29                Severe decrease  G5                    14 or less           Kidney failure          (1)In the absence of evidence of kidney disease, neither GFR category G1 or G2 fulfill the criteria for CKD.    eGFR calculation 2021 CKD-EPI creatinine equation, which does not include race as a factor    Protime-INR [[id number]]  (Normal) Collected: 04/03/25 0525    Specimen: Blood from Arm, Right Updated: 04/03/25 0554     Protime 13.8 Seconds      INR 1.07    CBC & Differential [[id number]]  (Abnormal) Collected: 04/03/25 0525    Specimen: Blood from Arm, Right Updated: 04/03/25 0532    Narrative:      The following orders were created for panel order CBC & Differential.  Procedure                               Abnormality         Status                     ---------                               -----------         ------                     CBC  Auto Differential[[id number]]        Abnormal            Final result                 Please view results for these tests on the individual orders.    CBC Auto Differential [[id number]]  (Abnormal) Collected: 04/03/25 0525    Specimen: Blood from Arm, Right Updated: 04/03/25 0532     WBC 4.38 10*3/mm3      RBC 4.56 10*6/mm3      Hemoglobin 13.6 g/dL      Hematocrit 41.0 %      MCV 89.9 fL      MCH 29.8 pg      MCHC 33.2 g/dL      RDW 13.2 %      RDW-SD 43.1 fl      MPV 10.2 fL      Platelets 232 10*3/mm3      Neutrophil % 54.8 %      Lymphocyte % 26.3 %      Monocyte % 14.4 %      Eosinophil % 3.2 %      Basophil % 1.1 %      Immature Grans % 0.2 %      Neutrophils, Absolute 2.40 10*3/mm3      Lymphocytes, Absolute 1.15 10*3/mm3      Monocytes, Absolute 0.63 10*3/mm3      Eosinophils, Absolute 0.14 10*3/mm3      Basophils, Absolute 0.05 10*3/mm3      Immature Grans, Absolute 0.01 10*3/mm3      nRBC 0.0 /100 WBC     High Sensitivity Troponin T 1Hr [[id number]]  (Normal) Collected: 04/01/25 1444    Specimen: Blood Updated: 04/01/25 1510     HS Troponin T 6 ng/L      Troponin T Numeric Delta -1 ng/L     Narrative:      High Sensitive Troponin T Reference Range:  <14.0 ng/L- Negative Female for AMI  <22.0 ng/L- Negative Male for AMI  >=14 - Abnormal Female indicating possible myocardial injury.  >=22 - Abnormal Male indicating possible myocardial injury.   Clinicians would have to utilize clinical acumen, EKG, Troponin, and serial changes to determine if it is an Acute Myocardial Infarction or myocardial injury due to an underlying chronic condition.         Basic Metabolic Panel [[id number]]  (Abnormal) Collected: 04/01/25 1340    Specimen: Blood Updated: 04/01/25 1406     Glucose 133 mg/dL      BUN 16 mg/dL      Creatinine 0.88 mg/dL      Sodium 139 mmol/L      Potassium 3.8 mmol/L      Chloride 101 mmol/L      CO2 24.9 mmol/L      Calcium 10.1 mg/dL      BUN/Creatinine Ratio 18.2     Anion Gap 13.1 mmol/L      eGFR 71.2  mL/min/1.73     Narrative:      GFR Categories in Chronic Kidney Disease (CKD)      GFR Category          GFR (mL/min/1.73)    Interpretation  G1                     90 or greater         Normal or high (1)  G2                      60-89                Mild decrease (1)  G3a                   45-59                Mild to moderate decrease  G3b                   30-44                Moderate to severe decrease  G4                    15-29                Severe decrease  G5                    14 or less           Kidney failure          (1)In the absence of evidence of kidney disease, neither GFR category G1 or G2 fulfill the criteria for CKD.    eGFR calculation 2021 CKD-EPI creatinine equation, which does not include race as a factor    High Sensitivity Troponin T [[id number]]  (Normal) Collected: 04/01/25 1340    Specimen: Blood Updated: 04/01/25 1406     HS Troponin T 7 ng/L     Narrative:      High Sensitive Troponin T Reference Range:  <14.0 ng/L- Negative Female for AMI  <22.0 ng/L- Negative Male for AMI  >=14 - Abnormal Female indicating possible myocardial injury.  >=22 - Abnormal Male indicating possible myocardial injury.   Clinicians would have to utilize clinical acumen, EKG, Troponin, and serial changes to determine if it is an Acute Myocardial Infarction or myocardial injury due to an underlying chronic condition.         BNP [[id number]]  (Normal) Collected: 04/01/25 1340    Specimen: Blood Updated: 04/01/25 1406     proBNP 159.0 pg/mL     Narrative:      This assay is used as an aid in the diagnosis of individuals suspected of having heart failure. It can be used as an aid in the diagnosis of acute decompensated heart failure (ADHF) in patients presenting with signs and symptoms of ADHF to the emergency department (ED). In addition, NT-proBNP of <300 pg/mL indicates ADHF is not likely.    Age Range Result Interpretation  NT-proBNP Concentration (pg/mL:      <50             Positive            >450                    Gray                 300-450                    Negative             <300    50-75           Positive            >900                  Gray                300-900                  Negative            <300      >75             Positive            >1800                  Gray                300-1800                  Negative            <300    aPTT [[id number]]  (Normal) Collected: 04/01/25 1340    Specimen: Blood Updated: 04/01/25 1352     PTT 25.7 seconds     Protime-INR [[id number]]  (Normal) Collected: 04/01/25 1340    Specimen: Blood Updated: 04/01/25 1352     Protime 13.7 Seconds      INR 1.06    CBC & Differential [[id number]]  (Normal) Collected: 04/01/25 1340    Specimen: Blood Updated: 04/01/25 1344    Narrative:      The following orders were created for panel order CBC & Differential.  Procedure                               Abnormality         Status                     ---------                               -----------         ------                     CBC Auto Differential[[id number]]        Normal              Final result                 Please view results for these tests on the individual orders.    CBC Auto Differential [[id number]]  (Normal) Collected: 04/01/25 1340    Specimen: Blood Updated: 04/01/25 1344     WBC 5.69 10*3/mm3      RBC 4.80 10*6/mm3      Hemoglobin 14.4 g/dL      Hematocrit 43.2 %      MCV 90.0 fL      MCH 30.0 pg      MCHC 33.3 g/dL      RDW 12.8 %      RDW-SD 42.3 fl      MPV 10.2 fL      Platelets 239 10*3/mm3      Neutrophil % 65.1 %      Lymphocyte % 21.6 %      Monocyte % 9.5 %      Eosinophil % 2.3 %      Basophil % 1.1 %      Immature Grans % 0.4 %      Neutrophils, Absolute 3.71 10*3/mm3      Lymphocytes, Absolute 1.23 10*3/mm3      Monocytes, Absolute 0.54 10*3/mm3      Eosinophils, Absolute 0.13 10*3/mm3      Basophils, Absolute 0.06 10*3/mm3      Immature Grans, Absolute 0.02 10*3/mm3      nRBC 0.0 /100 WBC              Results for orders placed during the  hospital encounter of 04/01/25    Adult Transthoracic Echo Complete W/ Cont if Necessary Per Protocol    Interpretation Summary  Indications  Chest pain    Technically satisfactory study.  Mitral valve is structurally normal.  Tricuspid valve is structurally normal.  Aortic valve is structurally normal.  Pulmonic valve could not be well visualized.  No evidence for mitral tricuspid or aortic regurgitation is seen by Doppler study.  Left atrium is normal in size.  Right atrium is normal in size.  Left ventricle is normal in size and contractility with ejection fraction of 60%.  Mild paradoxical septal motion is present from left bundle branch block.  Grade 1 left ventricular diastolic dysfunction is present.  Right ventricle is normal in size and contractility with a TAPSE of 3.1 cm.  Atrial septum is intact.  Aorta is normal.  No pericardial effusion or intracardiac thrombus is seen.    Impression  Structurally and functionally normal cardiac valves.  Left ventricular size and contractility is normal with ejection fraction of 60%.  Grade 1 left ventricular diastolic dysfunction is present.  Right ventricle is normal in size and contractility with a TAPSE of 3.1 cm.       Condition on Discharge:  Stable    Vital Signs  Temp:  [97.6 °F (36.4 °C)-98 °F (36.7 °C)] 97.7 °F (36.5 °C)  Heart Rate:  [68-87] 72  Resp:  [14-20] 14  BP: (118-173)/(51-82) 155/74      Physical Exam  Vitals reviewed.   Constitutional:       Appearance: She is not ill-appearing.   HENT:      Head: Normocephalic and atraumatic.      Right Ear: External ear normal.      Left Ear: External ear normal.      Nose: Nose normal.      Mouth/Throat:      Mouth: Mucous membranes are moist.   Eyes:      General:         Right eye: No discharge.         Left eye: No discharge.   Cardiovascular:      Rate and Rhythm: Normal rate and regular rhythm.      Pulses: Normal pulses.      Heart sounds: Normal heart sounds.   Pulmonary:      Effort: Pulmonary effort is  normal.      Breath sounds: Normal breath sounds.   Abdominal:      General: Bowel sounds are normal.      Palpations: Abdomen is soft.   Musculoskeletal:         General: Normal range of motion.      Cervical back: Normal range of motion.   Skin:     General: Skin is warm and dry.   Neurological:      Mental Status: She is alert and oriented to person, place, and time.   Psychiatric:         Behavior: Behavior normal.              Discharge Disposition  Home or Self Care    Discharge Medications     Discharge Medications        New Medications        Instructions Start Date   clopidogrel 75 MG tablet  Commonly known as: PLAVIX   75 mg, Oral, Daily   Start Date: April 4, 2025            Changes to Medications        Instructions Start Date   metoprolol succinate XL 25 MG 24 hr tablet  Commonly known as: TOPROL-XL  What changed:   medication strength  how much to take  how to take this  when to take this  additional instructions  Another medication with the same name was removed. Continue taking this medication, and follow the directions you see here.   25 mg, Oral, Every 24 Hours Scheduled   Start Date: April 4, 2025            Continue These Medications        Instructions Start Date   ADVIL PO   Oral      albuterol sulfate  (90 Base) MCG/ACT inhaler  Commonly known as: PROVENTIL HFA;VENTOLIN HFA;PROAIR HFA   2 puffs, Inhalation, Every 4 Hours PRN      amLODIPine 5 MG tablet  Commonly known as: NORVASC   5 mg, Oral, Daily      aspirin 81 MG EC tablet   81 mg, Oral, Daily, LD 7/7       cetirizine 10 MG tablet  Commonly known as: zyrTEC   10 mg, Oral, Daily      escitalopram 20 MG tablet  Commonly known as: LEXAPRO   1 tablet, Oral, Daily      losartan 50 MG tablet  Commonly known as: COZAAR   1 tablet, Oral, 2 Times Daily, HOLD 24 hours before surgery       montelukast 10 MG tablet  Commonly known as: SINGULAIR   No dose, route, or frequency recorded.      multivitamin with minerals tablet tablet   1 tablet,  Oral, Daily, Last dose 7/7       rosuvastatin 5 MG tablet  Commonly known as: CRESTOR   5 mg, Oral, Daily      Trelegy Ellipta 100-62.5-25 MCG/INH inhaler  Generic drug: Fluticasone-Umeclidin-Vilant   1 puff, Inhalation, Daily               Discharge Diet:   Diet Instructions       Diet: Regular/House Diet; Regular (IDDSI 7); Thin (IDDSI 0)      Discharge Diet: Regular/House Diet    Texture: Regular (IDDSI 7)    Fluid Consistency: Thin (IDDSI 0)            Activity at Discharge:   Activity Instructions       Gradually Increase Activity Until at Pre-Hospitalization Level              Follow-up Appointments  No future appointments.  Additional Instructions for the Follow-ups that You Need to Schedule       Discharge Follow-up with PCP   As directed       Currently Documented PCP:    Milagros Pal MD    PCP Phone Number:    [phone]     Follow Up Details: 2 weeks        Discharge Follow-up with Specified Provider: Dr Zhou; 3 Weeks   As directed      To: Dr Zhou   Follow Up: 3 Weeks                Test Results Pending at Discharge  Pending Results       Procedure [Order ID] Specimen - Date/Time    Cardiac Catheterization/Vascular Study [[id number]] Resulted: 04/03/25 1017     Updated: 04/03/25 1039             CAMERON Leija  04/03/25  15:38 EDT    Time: Discharge 25 min

## 2025-04-03 NOTE — PROGRESS NOTES
Referring Provider: Gabriela Ewing MD    Reason for follow-up:  Chest pain  Coronary artery disease  Abnormal stress Cardiolite test     Patient Care Team:  Milagros Pal MD as PCP - General (Family Medicine)  Brett Zhou MD as Consulting Physician (Cardiology)    Subjective .      ROS    Since I have last seen, the patient has been without any chest discomfort ,shortness of breath, palpitations, dizziness or syncope.  Denies having any headache ,abdominal pain ,nausea, vomiting , diarrhea constipation, loss of weight or loss of appetite.  Denies having any excessive bruising ,hematuria or blood in the stool.    Review of all systems negative except as indicated.    Reviewed ROS.  History  Past Medical History:   Diagnosis Date    Abnormal ECG Don’t  know    Asthma     Cholelithiasis ?    Not sure of year    Chronic coronary artery disease 4/1/2025    COPD (chronic obstructive pulmonary disease)     Hyperlipidemia     Hypertension     Liver disease fatty    PONV (postoperative nausea and vomiting)     Sleep apnea     on a cpap since 5/2019       Past Surgical History:   Procedure Laterality Date    APPENDECTOMY      BACK SURGERY      Disc    CARDIAC CATHETERIZATION N/A 01/24/2020    Procedure: Left Heart Cath with Coronary Angiography;  Surgeon: Brett Zohu MD;  Location: [facility] CATH INVASIVE LOCATION;  Service: Cardiovascular    CARDIAC CATHETERIZATION N/A 01/24/2020    Procedure: Left ventriculography;  Surgeon: Brett Zhou MD;  Location: [facility] CATH INVASIVE LOCATION;  Service: Cardiovascular    CHOLECYSTECTOMY      EPIGASTRIC HERNIA REPAIR N/A 7/13/2023    Procedure: EPIGASTRIC HERNIA REPAIR;  Surgeon: Joseph Kline MD;  Location: [facility] MAIN OR;  Service: General;  Laterality: N/A;    HEMORRHOIDECTOMY  1970    HYSTERECTOMY         Family History   Problem Relation Age of Onset    Stroke Mother     Alcohol abuse Mother     Stomach cancer Father     Heart attack Sister     Heart  disease Sister     Arthritis Sister     Miscarriages / Stillbirths Sister     Drug abuse Sister     Heart disease Brother     Heart disease Brother     Hypertension Brother     Kidney disease Brother     Heart disease Brother     Hearing loss Brother     Heart disease Maternal Grandmother     Heart disease Maternal Grandfather     No Known Problems Paternal Grandmother     No Known Problems Paternal Grandfather     Ovarian cancer Maternal Aunt     No Known Problems Maternal Uncle     No Known Problems Paternal Aunt     No Known Problems Paternal Uncle     No Known Problems Other     Anemia Neg Hx     Arrhythmia Neg Hx     Asthma Neg Hx     Clotting disorder Neg Hx     Fainting Neg Hx     Heart failure Neg Hx     Hyperlipidemia Neg Hx        Social History     Tobacco Use    Smoking status: Former     Current packs/day: 0.00     Average packs/day: 3.0 packs/day for 20.0 years (60.0 ttl pk-yrs)     Types: Cigarettes     Start date: 1968     Quit date: 1988     Years since quittin.2     Passive exposure: Past    Smokeless tobacco: Never   Vaping Use    Vaping status: Never Used   Substance Use Topics    Alcohol use: Not Currently     Comment: 1-2 glass a month    Drug use: Never     Comment: CBD oils        Medications Prior to Admission   Medication Sig Dispense Refill Last Dose/Taking    albuterol sulfate  (90 Base) MCG/ACT inhaler Inhale 2 puffs Every 4 (Four) Hours As Needed for Wheezing.       amLODIPine (NORVASC) 5 MG tablet Take 1 tablet by mouth Daily.       aspirin 81 MG EC tablet Take 1 tablet by mouth Daily. LD 7/       cetirizine (zyrTEC) 10 MG tablet Take 1 tablet by mouth Daily.       escitalopram (LEXAPRO) 20 MG tablet Take 1 tablet by mouth Daily.       Ibuprofen (ADVIL PO) Take  by mouth.       losartan (COZAAR) 50 MG tablet Take 1 tablet by mouth 2 (Two) Times a Day. HOLD 24 hours before surgery       metoprolol succinate XL (TOPROL-XL) 100 MG 24 hr tablet Take 1 tablet by mouth  Daily. TAKE 1 TABLET EVERY MORNINGFOR A TOTAL OF 150MG DAILY     take dos 90 tablet 3     metoprolol succinate XL (TOPROL-XL) 50 MG 24 hr tablet TAKE 1 TABLET EVERY EVENINGFOR A TOTAL OF 150MG DAILY (Patient taking differently: Take 1 tablet by mouth After Dinner. TAKE 1 TABLET EVERY EVENINGFOR A TOTAL OF 150MG DAILY) 90 tablet 3     montelukast (SINGULAIR) 10 MG tablet        Multiple Vitamins-Minerals (CENTRUM SILVER 50+WOMEN PO) Take 1 tablet by mouth Daily. Last dose 7/7       rosuvastatin (CRESTOR) 5 MG tablet Take 1 tablet by mouth Daily.       TRELEGY ELLIPTA 100-62.5-25 MCG/INH aerosol powder  Inhale 1 puff Daily.          Allergies  Penicillin g and Vancomycin    Scheduled Meds:amLODIPine, 5 mg, Oral, Daily  aspirin, 81 mg, Oral, Daily  escitalopram, 20 mg, Oral, Daily  losartan, 50 mg, Oral, BID  montelukast, 10 mg, Oral, Nightly  rosuvastatin, 5 mg, Oral, Nightly  sodium chloride, 10 mL, Intravenous, Q12H      Continuous Infusions:   PRN Meds:.  acetaminophen **OR** acetaminophen **OR** acetaminophen    senna-docusate sodium **AND** polyethylene glycol **AND** bisacodyl **AND** bisacodyl    Calcium Replacement - Follow Nurse / BPA Driven Protocol    diphenhydrAMINE    hydrALAZINE    Magnesium Standard Dose Replacement - Follow Nurse / BPA Driven Protocol    nitroglycerin    ondansetron ODT **OR** ondansetron    Phosphorus Replacement - Follow Nurse / BPA Driven Protocol    Potassium Replacement - Follow Nurse / BPA Driven Protocol    [COMPLETED] Insert Peripheral IV **AND** sodium chloride    sodium chloride    sodium chloride    Objective     VITAL SIGNS  Vitals:    04/03/25 0300 04/03/25 0400 04/03/25 0500 04/03/25 0600   BP:    118/51   BP Location:    Right arm   Patient Position:    Lying   Pulse: 72 71 71 82   Resp:    20   Temp:    97.8 °F (36.6 °C)   TempSrc:    Oral   SpO2: 93% 92% 95% 94%   Weight:       Height:           Flowsheet Rows      Flowsheet Row First Filed Value   Admission Height  "157.5 cm (62\") Documented at 04/01/2025 1322   Admission Weight 78.8 kg (173 lb 11.6 oz) Documented at 04/01/2025 1322            No intake or output data in the 24 hours ending 04/03/25 0640     TELEMETRY: Sinus rhythm    Physical Exam:  The patient is alert, oriented and in no distress.  Vital signs as noted above.     Head and neck revealed no carotid bruits or jugular venous distension.  No thyromegaly or lymphadenopathy is present.     Lungs clear.  No wheezing.  Breath sounds are normal bilaterally.     Heart normal first and second heart sounds.  No murmur..  No pericardial rub is present.  No gallop is present.     Abdomen soft and nontender.  No organomegaly is present.     Extremities revealed good peripheral pulses without any pedal edema.     Skin warm and dry.     Musculoskeletal system is grossly normal.     CNS grossly normal.    Reviewed and updated: 4/3/2025    Results Review:   I reviewed the patient's new clinical results.  Lab Results (last 24 hours)       Procedure Component Value Units Date/Time    Basic Metabolic Panel [[id number]]  (Abnormal) Collected: 04/03/25 0525    Specimen: Blood from Arm, Right Updated: 04/03/25 0555     Glucose 105 mg/dL      BUN 15 mg/dL      Creatinine 0.66 mg/dL      Sodium 140 mmol/L      Potassium 3.7 mmol/L      Comment: Specimen hemolyzed.  Result may be falsely elevated.        Chloride 104 mmol/L      CO2 24.4 mmol/L      Calcium 9.0 mg/dL      BUN/Creatinine Ratio 22.7     Anion Gap 11.6 mmol/L      eGFR 95.1 mL/min/1.73     Narrative:      GFR Categories in Chronic Kidney Disease (CKD)      GFR Category          GFR (mL/min/1.73)    Interpretation  G1                     90 or greater         Normal or high (1)  G2                      60-89                Mild decrease (1)  G3a                   45-59                Mild to moderate decrease  G3b                   30-44                Moderate to severe decrease  G4                    15-29                " Severe decrease  G5                    14 or less           Kidney failure          (1)In the absence of evidence of kidney disease, neither GFR category G1 or G2 fulfill the criteria for CKD.    eGFR calculation 2021 CKD-EPI creatinine equation, which does not include race as a factor    Protime-INR [[id number]]  (Normal) Collected: 04/03/25 0525    Specimen: Blood from Arm, Right Updated: 04/03/25 0554     Protime 13.8 Seconds      INR 1.07    CBC & Differential [[id number]]  (Abnormal) Collected: 04/03/25 0525    Specimen: Blood from Arm, Right Updated: 04/03/25 0532    Narrative:      The following orders were created for panel order CBC & Differential.  Procedure                               Abnormality         Status                     ---------                               -----------         ------                     CBC Auto Differential[[id number]]        Abnormal            Final result                 Please view results for these tests on the individual orders.    CBC Auto Differential [[id number]]  (Abnormal) Collected: 04/03/25 0525    Specimen: Blood from Arm, Right Updated: 04/03/25 0532     WBC 4.38 10*3/mm3      RBC 4.56 10*6/mm3      Hemoglobin 13.6 g/dL      Hematocrit 41.0 %      MCV 89.9 fL      MCH 29.8 pg      MCHC 33.2 g/dL      RDW 13.2 %      RDW-SD 43.1 fl      MPV 10.2 fL      Platelets 232 10*3/mm3      Neutrophil % 54.8 %      Lymphocyte % 26.3 %      Monocyte % 14.4 %      Eosinophil % 3.2 %      Basophil % 1.1 %      Immature Grans % 0.2 %      Neutrophils, Absolute 2.40 10*3/mm3      Lymphocytes, Absolute 1.15 10*3/mm3      Monocytes, Absolute 0.63 10*3/mm3      Eosinophils, Absolute 0.14 10*3/mm3      Basophils, Absolute 0.05 10*3/mm3      Immature Grans, Absolute 0.01 10*3/mm3      nRBC 0.0 /100 WBC             Imaging Results (Last 24 Hours)       ** No results found for the last 24 hours. **        LAB RESULTS (LAST 7 DAYS)    CBC  Results from last 7 days   Lab Units  04/03/25  0525 04/01/25  1340   WBC 10*3/mm3 4.38 5.69   RBC 10*6/mm3 4.56 4.80   HEMOGLOBIN g/dL 13.6 14.4   HEMATOCRIT % 41.0 43.2   MCV fL 89.9 90.0   PLATELETS 10*3/mm3 232 239       BMP  Results from last 7 days   Lab Units 04/03/25  0525 04/01/25  1340   SODIUM mmol/L 140 139   POTASSIUM mmol/L 3.7 3.8   CHLORIDE mmol/L 104 101   CO2 mmol/L 24.4 24.9   BUN mg/dL 15 16   CREATININE mg/dL 0.66 0.88   GLUCOSE mg/dL 105* 133*       CMP   Results from last 7 days   Lab Units 04/03/25  0525 04/01/25  1340   SODIUM mmol/L 140 139   POTASSIUM mmol/L 3.7 3.8   CHLORIDE mmol/L 104 101   CO2 mmol/L 24.4 24.9   BUN mg/dL 15 16   CREATININE mg/dL 0.66 0.88   GLUCOSE mg/dL 105* 133*         BNP        TROPONIN  Results from last 7 days   Lab Units 04/01/25  1444   HSTROP T ng/L 6       CoAg  Results from last 7 days   Lab Units 04/03/25  0525 04/01/25  1340   INR  1.07 1.06   APTT seconds  --  25.7       Creatinine Clearance  Estimated Creatinine Clearance: 78.1 mL/min (by C-G formula based on SCr of 0.66 mg/dL).    ABG        Radiology  XR Chest 1 View  Result Date: 4/1/2025  Impression: Minimal basal atelectasis and trace left effusion. Electronically Signed: Geovanni Ortega MD  4/1/2025 1:53 PM EDT  Workstation ID: [id number]          EKG      I personally viewed and interpreted the patient's EKG/Telemetry data:    ECHOCARDIOGRAM:    Results for orders placed during the hospital encounter of 04/01/25    Adult Transthoracic Echo Complete W/ Cont if Necessary Per Protocol    Interpretation Summary  Indications  Chest pain    Technically satisfactory study.  Mitral valve is structurally normal.  Tricuspid valve is structurally normal.  Aortic valve is structurally normal.  Pulmonic valve could not be well visualized.  No evidence for mitral tricuspid or aortic regurgitation is seen by Doppler study.  Left atrium is normal in size.  Right atrium is normal in size.  Left ventricle is normal in size and contractility with ejection  fraction of 60%.  Mild paradoxical septal motion is present from left bundle branch block.  Grade 1 left ventricular diastolic dysfunction is present.  Right ventricle is normal in size and contractility with a TAPSE of 3.1 cm.  Atrial septum is intact.  Aorta is normal.  No pericardial effusion or intracardiac thrombus is seen.    Impression  Structurally and functionally normal cardiac valves.  Left ventricular size and contractility is normal with ejection fraction of 60%.  Grade 1 left ventricular diastolic dysfunction is present.  Right ventricle is normal in size and contractility with a TAPSE of 3.1 cm.          STRESS TEST  Results for orders placed during the hospital encounter of 04/01/25    Stress Test With Myocardial Perfusion One Day    Interpretation Summary  Indications  Chest pain    This study was performed under the direct supervision of Zee HIGGINS.    Resting ECG  Sinus rhythm    The patient was injected with Lexiscan intravenously while constantly monitoring electrocardiogram and vital signs.  Patient did not have any chest discomfort ST abnormalities or ectopy with injection of Lexiscan.    Cardiolite was used as an imaging agent.    Cardiolite images showed mild apical ischemia.    Gated SPECT images revealed normal left ventricle size and contractility with ejection fraction of 71%.    Impression  ========  Lexiscan Cardiolite test revealed mild apical ischemia.    Gated SPECT images revealed normal left ventricular size and contractility with ejection fraction of 71%.    Suggest clinical correlation.        Cardiolite (Tc-99m sestamibi) stress test    CARDIAC CATHETERIZATION  Results for orders placed during the hospital encounter of 01/24/20    Cardiac Catheterization/Vascular Study    Narrative  CARDIAC CATHETERIZATION REPORT    DATE OF PROCEDURE: 1/24/2020    INDICATION FOR PROCEDURE:  Angina  Positive stress Cardiolite test    PROCEDURE PERFORMED: Left heart catheterization, coronary  angiography, left ventriculography    PROCEDURE COMMENTS:    Under usual sterile precautions and 1% Xylocaine infiltration right femoral artery was percutaneously punctured and a 5 Maori vascular sheath was introduced into the right femoral artery.  Left and right coronary arterial view was performed in varying degrees of obliquity followed by left ventricular angiogram.  Hemostasis was obtained and patient was returned to the room with intact pulses.  No complications were noted.    FINDINGS:    1. HEMODYNAMICS:  Left ventricle end-diastolic pressure is normal.  No gradient was noted across aortic valve.    2. LEFT VENTRICULOGRAPHY:  Left ventricle size and contractility is normal with ejection fraction of 60%.    3. CORONARY ANGIOGRAPHY:  Left main coronary artery normal.  Left anterior descending artery has diffuse calcification with mid segment 50% disease.  Circumflex coronary artery has ostial 50% disease.  Codominant vessel.  Right coronary artery is a codominant vessel that has 30 to 40% disease in the proximal segment.    SUMMARY:  Left ventricle size and contractility is normal with ejection fraction of 60%.    Left main coronary artery normal.  Left anterior descending artery has diffuse calcification with mid segment 50% disease.  Circumflex coronary artery has ostial 50% disease.  Codominant vessel.  Right coronary artery is a codominant vessel that has 30 to 40% disease in the proximal segment.    RECOMMENDATIONS:  Patient has significant symptoms of angina pectoris and had positive stress Cardiolite test with anterior and apical ischemia.  Cardiac catheterization revealed disease which is not critical.  Medical treatment and modification of risk factors.  Patient will be started on metoprolol XL 25 mg a day.  Follow-up in the office in 2 weeks.                OTHER:         Assessment & Plan     Principal Problem:    Chest pain  Active Problems:    Asthma    Hypertension    LBBB (left bundle branch  block)    Chest pressure    Chronic coronary artery disease    Unstable angina      ]]]]]]]]]]]]]]]]]]  History  ==========  -Chest discomfort suggestive of possible angina pectoris.  EKG showed sinus rhythm left bundle branch block  Troponin levels are negative.     - Chronic shortness of breath     -Chronic coronary artery disease    Echocardiogram 4/2/2025     Structurally and functionally normal cardiac valves.  Left ventricular size and contractility is normal with ejection fraction of 60%.  Grade 1 left ventricular diastolic dysfunction is present.  Right ventricle is normal in size and contractility with a TAPSE of 3.1 cm.     Stress Cardiolite test from 4/2/2025  Lexiscan Cardiolite test revealed mild apical ischemia.     Gated SPECT images revealed normal left ventricular size and contractility with ejection fraction of 71%.     Cardiac catheterization 1/24/2020  Left ventricle size and contractility is normal with ejection fraction of 60%.  Left main coronary artery normal.  Left anterior descending artery has diffuse calcification with mid segment 50% disease.  Circumflex coronary artery has ostial 50% disease.  Codominant vessel.  Right coronary artery is a codominant vessel that has 30 to 40% disease in the proximal segment.     Echocardiogram showed mild diffuse hypocontractility with ejection fraction of 55%.  Stress Cardiolite test abnormal with anterior and apical ischemia.     -Hypertension dyslipidemia.  History of asthma.     -Chronic left bundle branch block     -Strong family history of coronary artery disease     -Status post appendectomy back surgery cholecystectomy and hysterectomy.     -Former smoker     -Allergic to penicillin and vancomycin  ==========  Plan  ========  Chest discomfort suggestive of angina pectoris.  Troponin levels are negative.  EKG showed sinus rhythm left bundle branch block.     Chronic coronary artery disease.  Mid LAD diffuse disease 50% and ostial 50% circumflex  coronary artery disease 30 to 40% RCA disease.  (Cardiac cath 2020)    Echocardiogram-as above.  Stress Cardiolite test-abnormal.  Have discussed the findings with patient and educated her.    Patient was advised cardiac catheterization and coronary arteriography for definite evaluation of coronary artery status.  Risks and benefits pros and cons of the procedure including infection bleeding blood clot heart attack stroke allergic reaction to the dye renal dysfunction etc. were discussed with patient.  Have discussed with Dr. Jon for coordination of care and he will be performing cardiac catheterization and possible intervention.     Hypertension-well-controlled.    Dyslipidemia-continue Crestor.     Chronic left bundle branch block-stable and asymptomatic.     Medications were reviewed and updated.    Further plan will depend on patient's progress.    Reviewed and updated: 4/3/2025    ]]]]]]]]]]]]]]]     Cardiac cath-Dr. Jon-4/3/2025  Ostial circumflex and ramus intermedius disease (final report not available from Dr. Jon)  Medical treatment  Started Plavix and metoprolol XL.  Consider CABG in the future if patient has continued symptoms.  Okay with discharge plans.  Follow-up in the office in about 3 weeks.        Brett Zhou MD  04/03/25  06:40 EDT

## 2025-04-03 NOTE — PLAN OF CARE
Problem: Adult Inpatient Plan of Care  Goal: Plan of Care Review  Outcome: Met  Goal: Patient-Specific Goal (Individualized)  Outcome: Met  Goal: Absence of Hospital-Acquired Illness or Injury  Outcome: Met  Intervention: Identify and Manage Fall Risk  Recent Flowsheet Documentation  Taken 4/3/2025 1200 by Ginger Yang RN  Safety Promotion/Fall Prevention: patient off unit  Taken 4/3/2025 1000 by Ginger Yang RN  Safety Promotion/Fall Prevention: patient off unit  Taken 4/3/2025 0835 by Ginger Yang RN  Safety Promotion/Fall Prevention: safety round/check completed  Intervention: Prevent Skin Injury  Recent Flowsheet Documentation  Taken 4/3/2025 0835 by Ginger Yang RN  Skin Protection: incontinence pads utilized  Intervention: Prevent Infection  Recent Flowsheet Documentation  Taken 4/3/2025 1355 by Ginger Yang RN  Infection Prevention: environmental surveillance performed  Taken 4/3/2025 0835 by Ginger Yang RN  Infection Prevention:   single patient room provided   equipment surfaces disinfected   environmental surveillance performed  Goal: Optimal Comfort and Wellbeing  Outcome: Met  Intervention: Monitor Pain and Promote Comfort  Recent Flowsheet Documentation  Taken 4/3/2025 1355 by Ginger Yang RN  Pain Management Interventions: pain medication given  Intervention: Provide Person-Centered Care  Recent Flowsheet Documentation  Taken 4/3/2025 1355 by Ginger Yang RN  Trust Relationship/Rapport: care explained  Taken 4/3/2025 0835 by Ginger Yang RN  Trust Relationship/Rapport:   care explained   thoughts/feelings acknowledged  Goal: Readiness for Transition of Care  Outcome: Met  Goal: Plan of Care Review  Outcome: Met  Goal: Patient-Specific Goal (Individualized)  Outcome: Met  Goal: Absence of Hospital-Acquired Illness or Injury  Outcome: Met  Intervention: Identify and Manage Fall Risk  Recent Flowsheet Documentation  Taken 4/3/2025 1200 by Ginger Yang RN  Safety  Promotion/Fall Prevention: patient off unit  Taken 4/3/2025 1000 by Ginger Yang RN  Safety Promotion/Fall Prevention: patient off unit  Taken 4/3/2025 0835 by Ginger Yang RN  Safety Promotion/Fall Prevention: safety round/check completed  Intervention: Prevent Skin Injury  Recent Flowsheet Documentation  Taken 4/3/2025 0835 by Ginger Yang RN  Skin Protection: incontinence pads utilized  Intervention: Prevent Infection  Recent Flowsheet Documentation  Taken 4/3/2025 1355 by Ginger Yang RN  Infection Prevention: environmental surveillance performed  Taken 4/3/2025 0835 by Ginger Yang RN  Infection Prevention:   single patient room provided   equipment surfaces disinfected   environmental surveillance performed  Goal: Optimal Comfort and Wellbeing  Outcome: Met  Intervention: Monitor Pain and Promote Comfort  Recent Flowsheet Documentation  Taken 4/3/2025 1355 by Ginger Yang RN  Pain Management Interventions: pain medication given  Intervention: Provide Person-Centered Care  Recent Flowsheet Documentation  Taken 4/3/2025 1355 by Ginger Yang RN  Trust Relationship/Rapport: care explained  Taken 4/3/2025 0835 by Ginger Yang RN  Trust Relationship/Rapport:   care explained   thoughts/feelings acknowledged  Goal: Readiness for Transition of Care  Outcome: Met     Problem: Chest Pain  Goal: Resolution of Chest Pain Symptoms  Outcome: Met   Goal Outcome Evaluation:

## 2025-04-04 NOTE — CASE MANAGEMENT/SOCIAL WORK
Case Management Discharge Note      Final Note: routine home         Selected Continued Care - Discharged on 4/3/2025 Admission date: 4/1/2025 - Discharge disposition: Home or Self Care       Transportation Services  Private: Car    Final Discharge Disposition Code: 01 - home or self-care

## 2025-04-24 NOTE — PROGRESS NOTES
Encounter Date:04/28/2025  Last seen 4/3/2025-in hospital      Patient ID: Aleah Coleman is a 70 y.o. female.    Chief Complaint:  Chest discomfort  Shortness of breath  Chronic coronary artery disease  Hypertension  Dyslipidemia  Left bundle branch block    History of Present Illness  Patient recently was in the hospital with chest discomfort.  Patient had stress Cardiolite test and echocardiogram followed by cardiac catheterization.  Medications were adjusted and was discharged home.    Since I have last seen, the patient has been without any chest discomfort ,shortness of breath, palpitations, dizziness or syncope.  Denies having any headache ,abdominal pain ,nausea, vomiting , diarrhea constipation, loss of weight or loss of appetite.  Denies having any excessive bruising ,hematuria or blood in the stool.    Review of all systems negative except as indicated.    Reviewed ROS.    Assessment and Plan     ]]]]]]]]]]]]]]]]]  History  ==========  -Chest discomfort suggestive of possible angina pectoris.-Improved  EKG showed sinus rhythm left bundle branch block  Troponin levels are negative.     - Chronic shortness of breath     -Chronic coronary artery disease  History of 50% ostial circumflex coronary artery disease and 30 to 40% RCA disease 2020.    Cardiac cath-Dr. Jon-4/3/2025  Mid/Distal LAD %: 30% diffuse disease  LCX %: Ostial 50 to 60%  RCA %: 30% proximal     Echocardiogram 4/2/2025     Structurally and functionally normal cardiac valves.  Left ventricular size and contractility is normal with ejection fraction of 60%.  Grade 1 left ventricular diastolic dysfunction is present.  Right ventricle is normal in size and contractility with a TAPSE of 3.1 cm.     Stress Cardiolite test from 4/2/2025  Lexiscan Cardiolite test revealed mild apical ischemia.     Gated SPECT images revealed normal left ventricular size and contractility with ejection fraction of 71%.     Cardiac catheterization 1/24/2020  Left  ventricle size and contractility is normal with ejection fraction of 60%.  Left main coronary artery normal.  Left anterior descending artery has diffuse calcification with mid segment 50% disease.  Circumflex coronary artery has ostial 50% disease.  Codominant vessel.  Right coronary artery is a codominant vessel that has 30 to 40% disease in the proximal segment.     Echocardiogram showed mild diffuse hypocontractility with ejection fraction of 55%.  Stress Cardiolite test abnormal with anterior and apical ischemia.     -Hypertension dyslipidemia.  History of asthma.     -Chronic left bundle branch block     -Strong family history of coronary artery disease     -Status post appendectomy back surgery cholecystectomy and hysterectomy.     -Former smoker     -Allergic to penicillin and vancomycin  ==========  Plan  ========  Chest discomfort and shortness of breath-improved.    Chronic coronary artery disease  Cardiac cath-Dr. Jon-4/3/2025  Mid/Distal LAD %: 30% diffuse disease  LCX %: Ostial 50 to 60%  RCA %: 30% proximal    Maximize medical treatment.  Consider CABG in the future if disease in the circumflex coronary artery progresses significantly.  Location is not favorable for intervention.  Continue Plavix.    Patient was educated regarding the results of the testing.    Hypertension-149/77.  Maintain blood pressure log.    Dyslipidemia-continue Crestor    Chronic left bundle branch block-stable and asymptomatic.    Medications were reviewed and updated.    Follow-up in the office in 6 months.    Further plan will depend on patient's progress.    Reviewed and updated 4/28/2025.    ]]]]]]]]]]]]]]]                 Diagnosis Plan   1. Primary hypertension        2. LBBB (left bundle branch block)        3. Chest pressure        LAB RESULTS (LAST 7 DAYS)    CBC        BMP        CMP         BNP        TROPONIN        CoAg        Creatinine Clearance  Estimated Creatinine Clearance: 78.1 mL/min (by C-G formula  based on SCr of 0.66 mg/dL).    ABG        Radiology  No radiology results for the last day                The following portions of the patient's history were reviewed and updated as appropriate: allergies, current medications, past family history, past medical history, past social history, past surgical history, and problem list.    Review of Systems   Constitutional: Negative for malaise/fatigue.   Cardiovascular:  Negative for chest pain, leg swelling, palpitations and syncope.   Respiratory:  Negative for shortness of breath.    Skin:  Negative for rash.   Gastrointestinal:  Negative for nausea and vomiting.   Neurological:  Negative for dizziness, light-headedness and numbness.   All other systems reviewed and are negative.        Current Outpatient Medications:     albuterol sulfate  (90 Base) MCG/ACT inhaler, Inhale 2 puffs Every 4 (Four) Hours As Needed for Wheezing., Disp: , Rfl:     amLODIPine (NORVASC) 5 MG tablet, Take 1 tablet by mouth Daily., Disp: , Rfl:     aspirin 81 MG EC tablet, Take 1 tablet by mouth Daily. LD 7/7, Disp: , Rfl:     cetirizine (zyrTEC) 10 MG tablet, Take 1 tablet by mouth Daily., Disp: , Rfl:     clopidogrel (PLAVIX) 75 MG tablet, Take 1 tablet by mouth Daily., Disp: 30 tablet, Rfl: 1    escitalopram (LEXAPRO) 20 MG tablet, Take 1 tablet by mouth Daily., Disp: , Rfl:     Ibuprofen (ADVIL PO), Take  by mouth., Disp: , Rfl:     losartan (COZAAR) 50 MG tablet, Take 1 tablet by mouth 2 (Two) Times a Day. HOLD 24 hours before surgery, Disp: , Rfl:     metoprolol succinate XL (TOPROL-XL) 25 MG 24 hr tablet, Take 1 tablet by mouth Daily., Disp: 30 tablet, Rfl: 1    montelukast (SINGULAIR) 10 MG tablet, , Disp: , Rfl:     Multiple Vitamins-Minerals (CENTRUM SILVER 50+WOMEN PO), Take 1 tablet by mouth Daily. Last dose 7/7, Disp: , Rfl:     rosuvastatin (CRESTOR) 5 MG tablet, Take 1 tablet by mouth Daily., Disp: , Rfl:     TRELEGY ELLIPTA 100-62.5-25 MCG/INH aerosol powder , Inhale  1 puff Daily., Disp: , Rfl:     Allergies   Allergen Reactions    Penicillin G Other (See Comments)    Vancomycin Swelling       Family History   Problem Relation Age of Onset    Stroke Mother     Alcohol abuse Mother     Stomach cancer Father     Heart attack Sister     Heart disease Sister     Arthritis Sister     Miscarriages / Stillbirths Sister     Drug abuse Sister     Heart disease Brother     Heart disease Brother     Hypertension Brother     Kidney disease Brother     Heart disease Brother     Hearing loss Brother     Heart disease Maternal Grandmother     Heart disease Maternal Grandfather     No Known Problems Paternal Grandmother     No Known Problems Paternal Grandfather     Ovarian cancer Maternal Aunt     No Known Problems Maternal Uncle     No Known Problems Paternal Aunt     No Known Problems Paternal Uncle     No Known Problems Other     Anemia Neg Hx     Arrhythmia Neg Hx     Asthma Neg Hx     Clotting disorder Neg Hx     Fainting Neg Hx     Heart failure Neg Hx     Hyperlipidemia Neg Hx        Past Surgical History:   Procedure Laterality Date    APPENDECTOMY      BACK SURGERY      Disc    CARDIAC CATHETERIZATION N/A 01/24/2020    Procedure: Left Heart Cath with Coronary Angiography;  Surgeon: Brett Zhou MD;  Location:  [facility];  Service: Cardiovascular    CARDIAC CATHETERIZATION N/A 01/24/2020    Procedure: Left ventriculography;  Surgeon: Brett Zhou MD;  Location:  [facility];  Service: Cardiovascular    CARDIAC CATHETERIZATION Right 4/3/2025    Procedure: Coronary angiography;  Surgeon: Cole Jon MD;  Location:  [facility];  Service: Cardiovascular;  Laterality: Right;    CARDIAC CATHETERIZATION N/A 4/3/2025    Procedure: Left Heart Cath;  Surgeon: Cole Jon MD;  Location: [facility] CATH INVASIVE LOCATION;  Service: Cardiovascular;  Laterality: N/A;    CHOLECYSTECTOMY      EPIGASTRIC HERNIA REPAIR N/A 7/13/2023     Procedure: EPIGASTRIC HERNIA REPAIR;  Surgeon: Joseph Kline MD;  Location: [facility] MAIN OR;  Service: General;  Laterality: N/A;    HEMORRHOIDECTOMY      HYSTERECTOMY         Past Medical History:   Diagnosis Date    Abnormal ECG Don’t  know    Asthma     Cholelithiasis ?    Not sure of year    Chronic coronary artery disease 2025    COPD (chronic obstructive pulmonary disease)     Hyperlipidemia     Hypertension     Liver disease fatty    PONV (postoperative nausea and vomiting)     Sleep apnea     on a cpap since 2019       Family History   Problem Relation Age of Onset    Stroke Mother     Alcohol abuse Mother     Stomach cancer Father     Heart attack Sister     Heart disease Sister     Arthritis Sister     Miscarriages / Stillbirths Sister     Drug abuse Sister     Heart disease Brother     Heart disease Brother     Hypertension Brother     Kidney disease Brother     Heart disease Brother     Hearing loss Brother     Heart disease Maternal Grandmother     Heart disease Maternal Grandfather     No Known Problems Paternal Grandmother     No Known Problems Paternal Grandfather     Ovarian cancer Maternal Aunt     No Known Problems Maternal Uncle     No Known Problems Paternal Aunt     No Known Problems Paternal Uncle     No Known Problems Other     Anemia Neg Hx     Arrhythmia Neg Hx     Asthma Neg Hx     Clotting disorder Neg Hx     Fainting Neg Hx     Heart failure Neg Hx     Hyperlipidemia Neg Hx        Social History     Socioeconomic History    Marital status:    Tobacco Use    Smoking status: Former     Current packs/day: 0.00     Average packs/day: 3.0 packs/day for 20.0 years (60.0 ttl pk-yrs)     Types: Cigarettes     Start date: 1968     Quit date: 1988     Years since quittin.3     Passive exposure: Past    Smokeless tobacco: Never   Vaping Use    Vaping status: Never Used   Substance and Sexual Activity    Alcohol use: Not Currently     Comment: 1-2 glass a  "month    Drug use: Never     Comment: CBD oils    Sexual activity: Not Currently     Partners: Male     Birth control/protection: Abstinence, Post-menopausal, Tubal ligation, Hysterectomy         Procedures      Objective:       Physical Exam    /77 (BP Location: Left arm, Patient Position: Sitting, Cuff Size: Adult)   Pulse 66   Ht 157.5 cm (62\")   SpO2 97%   BMI 31.64 kg/m²   The patient is alert, oriented and in no distress.    Vital signs as noted above.    Head and neck revealed no carotid bruits or jugular venous distension.  No thyromegaly or lymphadenopathy is present.    Lungs clear.  No wheezing.  Breath sounds are normal bilaterally.    Heart normal first and second heart sounds.  No murmur..  No pericardial rub is present.  No gallop is present.    Abdomen soft and nontender.  No organomegaly is present.    Extremities revealed good peripheral pulses without any pedal edema.    Skin warm and dry.    Musculoskeletal system is grossly normal.    CNS grossly normal.    Reviewed and updated          "

## 2025-04-28 ENCOUNTER — OFFICE VISIT (OUTPATIENT)
Dept: CARDIOLOGY | Facility: CLINIC | Age: 70
End: 2025-04-28
Payer: MEDICARE

## 2025-04-28 VITALS
BODY MASS INDEX: 31.64 KG/M2 | SYSTOLIC BLOOD PRESSURE: 149 MMHG | DIASTOLIC BLOOD PRESSURE: 77 MMHG | HEART RATE: 66 BPM | OXYGEN SATURATION: 97 % | HEIGHT: 62 IN

## 2025-04-28 DIAGNOSIS — I10 PRIMARY HYPERTENSION: Primary | ICD-10-CM

## 2025-04-28 DIAGNOSIS — I44.7 LBBB (LEFT BUNDLE BRANCH BLOCK): ICD-10-CM

## 2025-04-28 DIAGNOSIS — R07.89 CHEST PRESSURE: ICD-10-CM

## 2025-04-28 PROCEDURE — 3077F SYST BP >= 140 MM HG: CPT | Performed by: INTERNAL MEDICINE

## 2025-04-28 PROCEDURE — 3078F DIAST BP <80 MM HG: CPT | Performed by: INTERNAL MEDICINE

## 2025-04-28 PROCEDURE — 99214 OFFICE O/P EST MOD 30 MIN: CPT | Performed by: INTERNAL MEDICINE

## 2025-04-28 PROCEDURE — 1160F RVW MEDS BY RX/DR IN RCRD: CPT | Performed by: INTERNAL MEDICINE

## 2025-04-28 PROCEDURE — 1159F MED LIST DOCD IN RCRD: CPT | Performed by: INTERNAL MEDICINE

## 2025-08-25 ENCOUNTER — TELEPHONE (OUTPATIENT)
Dept: CARDIOLOGY | Facility: CLINIC | Age: 70
End: 2025-08-25
Payer: MEDICARE

## 2025-08-25 RX ORDER — CLOPIDOGREL BISULFATE 75 MG/1
75 TABLET ORAL DAILY
Qty: 90 TABLET | Refills: 2 | Status: SHIPPED | OUTPATIENT
Start: 2025-08-25

## (undated) DEVICE — PINNACLE INTRODUCER SHEATH: Brand: PINNACLE

## (undated) DEVICE — PK TRY HEART CATH 50

## (undated) DEVICE — CATH DIAG IMPULSE PIG .056 6F 110CM

## (undated) DEVICE — NDL PERC 1PRT THNWALL W/BASEPLT 18G 7CM

## (undated) DEVICE — CATH DIAG IMPULSE PIG 5F 100CM

## (undated) DEVICE — CATH DIAG IMPULSE FL4 5F 100CM

## (undated) DEVICE — GW DIAG EMERALD HEPCOAT MOVE JTIP STD .035 3MM 150CM

## (undated) DEVICE — CATH DIAG IMPULSE FR4 6F 100CM

## (undated) DEVICE — CATH DIAG IMPULSE FR4 5F 100CM

## (undated) DEVICE — DGW .035 MC J3MM 150CM T H AMP: Brand: EMERALD

## (undated) DEVICE — CATH DIAG IMPULSE FL4 6F 100CM